# Patient Record
Sex: MALE | Race: WHITE | NOT HISPANIC OR LATINO | Employment: FULL TIME | URBAN - METROPOLITAN AREA
[De-identification: names, ages, dates, MRNs, and addresses within clinical notes are randomized per-mention and may not be internally consistent; named-entity substitution may affect disease eponyms.]

---

## 2017-09-25 ENCOUNTER — HOSPITAL ENCOUNTER (EMERGENCY)
Facility: HOSPITAL | Age: 22
Discharge: HOME | End: 2017-09-25
Attending: EMERGENCY MEDICINE | Admitting: EMERGENCY MEDICINE
Payer: OTHER MISCELLANEOUS

## 2017-09-25 VITALS
SYSTOLIC BLOOD PRESSURE: 118 MMHG | DIASTOLIC BLOOD PRESSURE: 60 MMHG | OXYGEN SATURATION: 97 % | TEMPERATURE: 98.1 F | WEIGHT: 158.73 LBS | HEART RATE: 68 BPM | RESPIRATION RATE: 16 BRPM

## 2017-09-25 DIAGNOSIS — T30.0 THERMAL BURN: Primary | ICD-10-CM

## 2017-09-25 PROCEDURE — 99283 EMERGENCY DEPT VISIT LOW MDM: CPT

## 2017-09-25 PROCEDURE — 90715 TDAP VACCINE 7 YRS/> IM: CPT | Performed by: EMERGENCY MEDICINE

## 2017-09-25 PROCEDURE — 90471 IMMUNIZATION ADMIN: CPT

## 2017-09-25 RX ORDER — GINSENG 100 MG
1 CAPSULE ORAL ONCE
Status: COMPLETED | OUTPATIENT
Start: 2017-09-25 | End: 2017-09-25

## 2017-09-25 RX ADMIN — BACITRACIN ZINC 1 SMALL APPLICATION: 500 OINTMENT TOPICAL at 09:57

## 2017-09-25 RX ADMIN — TETANUS TOXOID, REDUCED DIPHTHERIA TOXOID AND ACELLULAR PERTUSSIS VACCINE, ADSORBED 0.5 ML: 5; 2.5; 8; 8; 2.5 SUSPENSION INTRAMUSCULAR at 09:58

## 2020-07-08 ENCOUNTER — OFFICE VISIT (OUTPATIENT)
Dept: FAMILY MEDICINE CLINIC | Facility: CLINIC | Age: 25
End: 2020-07-08
Payer: COMMERCIAL

## 2020-07-08 VITALS
DIASTOLIC BLOOD PRESSURE: 66 MMHG | HEART RATE: 71 BPM | HEIGHT: 73 IN | SYSTOLIC BLOOD PRESSURE: 104 MMHG | RESPIRATION RATE: 16 BRPM | OXYGEN SATURATION: 98 % | TEMPERATURE: 98.1 F | BODY MASS INDEX: 22.53 KG/M2 | WEIGHT: 170 LBS

## 2020-07-08 DIAGNOSIS — Z13.6 SCREENING FOR CARDIOVASCULAR CONDITION: ICD-10-CM

## 2020-07-08 DIAGNOSIS — R22.32 MASS OF LEFT FOREARM: ICD-10-CM

## 2020-07-08 DIAGNOSIS — Z00.00 ANNUAL PHYSICAL EXAM: Primary | ICD-10-CM

## 2020-07-08 DIAGNOSIS — D17.9 LIPOMA, UNSPECIFIED SITE: ICD-10-CM

## 2020-07-08 DIAGNOSIS — M79.10 MYALGIA: ICD-10-CM

## 2020-07-08 DIAGNOSIS — Z13.29 THYROID DISORDER SCREEN: ICD-10-CM

## 2020-07-08 PROCEDURE — 99203 OFFICE O/P NEW LOW 30 MIN: CPT | Performed by: FAMILY MEDICINE

## 2020-07-08 PROCEDURE — 99385 PREV VISIT NEW AGE 18-39: CPT | Performed by: FAMILY MEDICINE

## 2020-07-08 PROCEDURE — 1036F TOBACCO NON-USER: CPT | Performed by: FAMILY MEDICINE

## 2020-07-08 PROCEDURE — 3008F BODY MASS INDEX DOCD: CPT | Performed by: FAMILY MEDICINE

## 2020-07-08 NOTE — PATIENT INSTRUCTIONS
Please contact your insurance if you are uncertain of coverage for plan of care items  Your insurance may not cover the cost of your Vitamin D blood test, which is approximately $65-70  Please notify the lab prior to blood draw if you would like to decline this test       Wellness Visit for Adults   AMBULATORY CARE:   A wellness visit  is when you see your healthcare provider to get screened for health problems  You can also get advice on how to stay healthy  Write down your questions so you remember to ask them  Ask your healthcare provider how often you should have a wellness visit  What happens at a wellness visit:  Your healthcare provider will ask about your health, and your family history of health problems  This includes high blood pressure, heart disease, and cancer  He or she will ask if you have symptoms that concern you, if you smoke, and about your mood  You may also be asked about your intake of medicines, supplements, food, and alcohol  Any of the following may be done:  · Your weight  will be checked  Your height may also be checked so your body mass index (BMI) can be calculated  Your BMI shows if you are at a healthy weight  · Your blood pressure  and heart rate will be checked  Your temperature may also be checked  · Blood and urine tests  may be done  Blood tests may be done to check your cholesterol levels  Abnormal cholesterol levels increase your risk for heart disease and stroke  You may also need a blood or urine test to check for diabetes if you are at increased risk  Urine tests may be done to look for signs of an infection or kidney disease  · A physical exam  includes checking your heartbeat and lungs with a stethoscope  Your healthcare provider may also check your skin to look for sun damage  · Screening tests  may be recommended  A screening test is done to check for diseases that may not cause symptoms   The screening tests you may need depend on your age, gender, family history, and lifestyle habits  For example, colorectal screening may be recommended if you are 48years old or older  Screening tests you need if you are a woman:   · A Pap smear  is used to screen for cervical cancer  Pap smears are usually done every 3 to 5 years depending on your age  You may need them more often if you have had abnormal Pap smear test results in the past  Ask your healthcare provider how often you should have a Pap smear  · A mammogram  is an x-ray of your breasts to screen for breast cancer  Experts recommend mammograms every 2 years starting at age 48 years  You may need a mammogram at age 52 years or younger if you have an increased risk for breast cancer  Talk to your healthcare provider about when you should start having mammograms and how often you need them  Vaccines you may need:   · Get an influenza vaccine  every year  The influenza vaccine protects you from the flu  Several types of viruses cause the flu  The viruses change over time, so new vaccines are made each year  · Get a tetanus-diphtheria (Td) booster vaccine  every 10 years  This vaccine protects you against tetanus and diphtheria  Tetanus is a severe infection that may cause painful muscle spasms and lockjaw  Diphtheria is a severe bacterial infection that causes a thick covering in the back of your mouth and throat  · Get a human papillomavirus (HPV) vaccine  if you are female and aged 23 to 32 or male 23 to 24 and never received it  This vaccine protects you from HPV infection  HPV is the most common infection spread by sexual contact  HPV may also cause vaginal, penile, and anal cancers  · Get a pneumococcal vaccine  if you are aged 72 years or older  The pneumococcal vaccine is an injection given to protect you from pneumococcal disease  Pneumococcal disease is an infection caused by pneumococcal bacteria  The infection may cause pneumonia, meningitis, or an ear infection      · Get a shingles vaccine  if you are aged 61 or older, even if you have had shingles before  The shingles vaccine is an injection to protect you from the varicella-zoster virus  This is the same virus that causes chickenpox  Shingles is a painful rash that develops in people who had chickenpox or have been exposed to the virus  How to eat healthy:  My Plate is a model for planning healthy meals  It shows the types and amounts of foods that should go on your plate  Fruits and vegetables make up about half of your plate, and grains and protein make up the other half  A serving of dairy is included on the side of your plate  The amount of calories and serving sizes you need depends on your age, gender, weight, and height  Examples of healthy foods are listed below:  · Eat a variety of vegetables  such as dark green, red, and orange vegetables  You can also include canned vegetables low in sodium (salt) and frozen vegetables without added butter or sauces  · Eat a variety of fresh fruits , canned fruit in 100% juice, frozen fruit, and dried fruit  · Include whole grains  At least half of the grains you eat should be whole grains  Examples include whole-wheat bread, wheat pasta, brown rice, and whole-grain cereals such as oatmeal     · Eat a variety of protein foods such as seafood (fish and shellfish), lean meat, and poultry without skin (turkey and chicken)  Examples of lean meats include pork leg, shoulder, or tenderloin, and beef round, sirloin, tenderloin, and extra lean ground beef  Other protein foods include eggs and egg substitutes, beans, peas, soy products, nuts, and seeds  · Choose low-fat dairy products such as skim or 1% milk or low-fat yogurt, cheese, and cottage cheese  · Limit unhealthy fats  such as butter, hard margarine, and shortening  Exercise:  Exercise at least 30 minutes per day on most days of the week  Some examples of exercise include walking, biking, dancing, and swimming   You can also fit in more physical activity by taking the stairs instead of the elevator or parking farther away from stores  Include muscle strengthening activities 2 days each week  Regular exercise provides many health benefits  It helps you manage your weight, and decreases your risk for type 2 diabetes, heart disease, stroke, and high blood pressure  Exercise can also help improve your mood  Ask your healthcare provider about the best exercise plan for you  General health and safety guidelines:   · Do not smoke  Nicotine and other chemicals in cigarettes and cigars can cause lung damage  Ask your healthcare provider for information if you currently smoke and need help to quit  E-cigarettes or smokeless tobacco still contain nicotine  Talk to your healthcare provider before you use these products  · Limit alcohol  A drink of alcohol is 12 ounces of beer, 5 ounces of wine, or 1½ ounces of liquor  · Lose weight, if needed  Being overweight increases your risk of certain health conditions  These include heart disease, high blood pressure, type 2 diabetes, and certain types of cancer  · Protect your skin  Do not sunbathe or use tanning beds  Use sunscreen with a SPF 15 or higher  Apply sunscreen at least 15 minutes before you go outside  Reapply sunscreen every 2 hours  Wear protective clothing, hats, and sunglasses when you are outside  · Drive safely  Always wear your seatbelt  Make sure everyone in your car wears a seatbelt  A seatbelt can save your life if you are in an accident  Do not use your cell phone when you are driving  This could distract you and cause an accident  Pull over if you need to make a call or send a text message  · Practice safe sex  Use latex condoms if are sexually active and have more than one partner  Your healthcare provider may recommend screening tests for sexually transmitted infections (STIs)  · Wear helmets, lifejackets, and protective gear    Always wear a helmet when you ride a bike or motorcycle, go skiing, or play sports that could cause a head injury  Wear protective equipment when you play sports  Wear a lifejacket when you are on a boat or doing water sports  © 2017 [employer] Information is for End User's use only and may not be sold, redistributed or otherwise used for commercial purposes  All illustrations and images included in CareNotes® are the copyrighted property of [employer]  or Zackary Romero  The above information is an  only  It is not intended as medical advice for individual conditions or treatments  Talk to your doctor, nurse or pharmacist before following any medical regimen to see if it is safe and effective for you  Cholesterol and Your Health   AMBULATORY CARE:   Cholesterol  is a waxy, fat-like substance  Cholesterol is made by your body, but also comes from certain foods you eat  Your body uses cholesterol to make hormones and new cells  Your body also uses cholesterol to protect nerves  Cholesterol comes from foods such as meat and dairy products  Your total cholesterol level is made up by LDL cholesterol, HDL cholesterol, and triglycerides:  · LDL cholesterol  is called bad cholesterol  because it forms plaque in your arteries  As plaque builds up, your arteries become narrow, and less blood flows through  When plaque decreases blood flow to your heart, you may have chest pain  If plaque completely blocks an artery that bring blood to your heart, you may have a heart attack  Plaque can break off and form blood clots  Blood clots may block arteries in your brain and cause a stroke  · HDL cholesterol  is called good cholesterol  because it helps remove LDL cholesterol from your arteries  It does this by attaching to LDL cholesterol and carrying it to your liver  Your liver breaks down LDL cholesterol so your body can get rid of it   High levels of HDL cholesterol can help prevent a heart attack and stroke  Low levels of HDL cholesterol can increase your risk for heart disease, heart attack, and stroke  · Triglycerides  are a type of fat that store energy from foods you eat  High levels of triglycerides also cause plaque buildup  This can increase your risk for a heart attack or stroke  If your triglyceride level is high, your LDL cholesterol level may also be high  How food affects your cholesterol levels:   · Unhealthy fats  increase LDL cholesterol and triglyceride levels in your blood  They are found in foods high in cholesterol, saturated fat, and trans fat:     ¨ Cholesterol  is found in eggs, dairy, and meat  ¨ Saturated fat  is found in butter, cheese, ice cream, whole milk, and coconut oil  Saturated fat is also found in meat, such as sausage, hot dogs, and bologna  ¨ Trans fat  is found in liquid oils and is used in fried and baked foods  Foods that contain trans fats include chips, crackers, muffins, sweet rolls, microwave popcorn, and cookies  · Healthy fats,  also called unsaturated fats, help lower LDL cholesterol and triglyceride levels  Healthy fats include the following:     ¨ Monounsaturated fats  are found in foods such as olive oil, canola oil, avocado, nuts, and olives  ¨ Polyunsaturated fats,  such as omega 3 fats, are found in fish, such as salmon, trout, and tuna  They can also be found in plant foods such as flaxseed, walnuts, and soybeans  Other things that affect your cholesterol levels:   · Smoking cigarettes    · Being overweight or obese     · Drinking large amounts of alcohol    · Not enough exercise or no exercise    · Certain genes passed from your parents to you  What you need to know about having your cholesterol levels checked: Adults 21to 39years of age should have their cholesterol levels checked every 4 to 6 years  Adults 45 years and older should have their cholesterol checked every 1 to 2 years   You may need your cholesterol checked more often, or at a younger age, if you have risk factors for heart disease  You may also need to have your cholesterol checked more often if you have other health conditions, such as diabetes  Blood tests are used to check cholesterol levels  Blood tests measure your levels of triglycerides, LDL cholesterol, and HDL cholesterol  Cholesterol level goals: Your cholesterol level goal may depend on your risk for heart disease  It may also depend on your age and other health conditions  Ask your healthcare provider if the following goals are right for you:  · Your total cholesterol level  should be less than 200 mg/dL  This number may also depend on your HDL and LDL cholesterol goals  · Your LDL cholesterol level  should be less than 130 mg/dL  · Your HDL cholesterol level  should be 60 mg/dL or higher  · Your triglyceride level  should be less than 150 mg/dL  Treatment for high cholesterol:  Treatment for high cholesterol will also decrease your risk of heart disease, heart attack, and stroke  Treatment may include any of the following:  · Medicines  may be given to lower your LDL cholesterol, triglyceride levels, or total cholesterol level  You may need medicines to lower your cholesterol if any of the following is true:     ¨ You have a history of stroke, TIA, unstable angina, or a heart attack    ¨ Your LDL cholesterol level is 190 mg/dL or higher    ¨ You are age 36to 76years of age, have diabetes, and your LDL cholesterol is 70 mg/dL or higher    ¨ You are age 36to 76years of age, have risk factors for heart disease, and your LDL cholesterol is 70 mg/dL or higher    · Lifestyle changes  include changes to your diet, exercise, weight loss, and quitting smoking  It also includes decreasing the amount of alcohol you drink  · Supplements  include fish oil, red yeast rice, and garlic  Fish oil may help lower your triglyceride and LDL cholesterol levels   It may also increase your HDL cholesterol level  Red yeast rice may help decrease your total cholesterol level and LDL cholesterol level  Garlic may help lower your total cholesterol level  Do not take these supplements without talking to your healthcare provider  Nutrition to help lower your cholesterol levels:  A registered dietitian can help you create a healthy eating plan  Read food labels and choose foods low in saturated fat, trans fats, and cholesterol  · Decrease the total amount of fat you eat  Choose lean meats, fat-free or 1% fat milk, and low-fat dairy products, such as yogurt and cheese  Try to limit or avoid red meats  Limit or do not eat fried foods or baked goods such as cookies  · Replace unhealthy fats with healthy fats  Cook foods in olive oil or canola oil  Choose soft margarines that are low in saturated fat and trans fat  Seeds, nuts, and avocados are other examples of healthy fats  · Eat foods with omega-3 fats  Examples include salmon, tuna, mackerel, walnuts, and flaxseed  Eat fish 2 times per week  Children and pregnant women should not eat fish that have high levels of mercury, such as shark, swordfish, and noris mackerel  · Increase the amount of plant-based foods you eat  Plant-based foods are low in cholesterol and fat  Eating more of these foods may help lower your cholesterol and help you lose weight  Examples of plant-based foods includes fruits, vegetables, legumes, and whole grains  Replace milk that contains dairy with almond, soy, or coconut milk  Eat beans and foods with soy for protein instead of meat  Ask your healthcare provider or dietitian for more information on plant-based foods  · Increase the amount of fiber you eat  High-fiber foods can help lower your LDL cholesterol  You should eat between 20 and 30 grams of fiber each day  Eat at least 5 servings of fruits and vegetables each day   Other examples of high-fiber foods include whole-grain or whole-wheat breads, pastas, or cereals, and brown rice  Eat 3 ounces of whole-grain foods each day  Increase fiber slowly  You may have abdominal discomfort, bloating, and gas if you add fiber to your diet too quickly  Lifestyle changes you can make to help lower your cholesterol levels:   · Maintain a healthy weight  Ask your healthcare provider how much you should weigh  Ask him or her to help you create a weight loss plan if you are overweight  Weight loss can decrease your total cholesterol and triglyceride levels  · Exercise regularly  Exercise can help lower your total cholesterol level and maintain a healthy weight  Exercise can also help increase your HDL cholesterol level  Work with your healthcare provider to create an exercise program that is right for you  Get at least 30 minutes of moderate exercise most days of the week  Examples of exercise include brisk walking, swimming, or biking  · Do not smoke  Nicotine and other chemicals in cigarettes and cigars can damage your lungs, heart, and blood vessels  They can also raise your triglyceride levels  Ask your healthcare provider for information if you currently smoke and need help to quit  E-cigarettes or smokeless tobacco still contain nicotine  Talk to your healthcare provider before you use these products  · Limit or do not drink alcohol  Alcohol can increase your triglyceride levels  Ask your healthcare provider if it is safe for you to drink alcohol  Also ask how much is safe for you to drink each day  © 2017 [address] [employer]  Information is for End User's use only and may not be sold, redistributed or otherwise used for commercial purposes  All illustrations and images included in CareNotes® are the copyrighted property of [employer]  or Zackary Romero  The above information is an  only  It is not intended as medical advice for individual conditions or treatments   Talk to your doctor, nurse or pharmacist before following any medical regimen to see if it is safe and effective for you

## 2020-07-08 NOTE — PROGRESS NOTES
Assessment/Plan:  Problem List Items Addressed This Visit     None      Visit Diagnoses     Annual physical exam    -  Primary    Recommend lifestyle modifications  Lipoma, unspecified site        Relevant Orders    Ambulatory referral to [facility] limited    Patient considering likely lipoma removal as bothersome with weightlifting  Mass of left forearm        Relevant Orders    Ambulatory referral to Hand Surgery     [facility] limited    Likely due to lipoma  Screening for cardiovascular condition        Relevant Orders    CBC and differential    Comprehensive metabolic panel    Lipid panel    LDL cholesterol, direct    Thyroid disorder screen        Relevant Orders    TSH, 3rd generation with Free T4 reflex    Myalgia        Relevant Orders    Vitamin D 25 hydroxy           Return in about 1 year (around 7/8/2021) for Annual physical; PRN Labs  No future appointments  Subjective: Dania Menon is a 30 y o  male who presents today as a new patient for his medical conditions  New Patient    Previous PCP:  Dr Lillie Martell at [address]  Reason for Transfer:  Wife prefers [facility] seen by previous PCP:  12/12/17  Last Labs:  2/10/18  Last Physical:  Unsure  Medical Records Requsted:  No      HPI:  Chief Complaint   Patient presents with    Establish Care     New patient  Declined HIV     Annual Exam     Annual exam,    Mass     Has lump in arms, no pain, new ones has grown      -- Above per clinical staff and reviewed  --      HPI      Today:      Controlled Substance Review    [place] PDMP or [place]  reviewed: No red flags were identified; safe to proceed with prescription  [address]  +Exercise - Working out - weightlifting, cardio, - 1 hour, 5 days per week    Left Ventral Forearm, B/L Hips, Lower Abdomen Mass - Symptoms x years, unchanged  No imaging previously though prior PCP ordered LUE U/S  No pain, warmth, or redness        Reviewed:  Labs 2/10/18    Overdue for Dentist   [facility] for Optho  The following portions of the patient's history were reviewed and updated as appropriate: allergies, current medications, past family history, past medical history, past social history, past surgical history and problem list       Review of Systems   Constitutional: Negative for appetite change, chills, diaphoresis, fatigue and fever  Respiratory: Negative for chest tightness and shortness of breath  Cardiovascular: Negative for chest pain  Gastrointestinal: Negative for abdominal pain, blood in stool, diarrhea, nausea and vomiting  Genitourinary: Negative for dysuria  No current outpatient medications on file  No current facility-administered medications for this visit  Objective:  /66   Pulse 71   Temp 98 1 °F (36 7 °C) (Tympanic)   Resp 16   Ht 6' 0 64" (1 845 m)   Wt 77 1 kg (170 lb)   SpO2 98%   BMI 22 65 kg/m²    Wt Readings from Last 3 Encounters:   07/08/20 77 1 kg (170 lb)   09/25/17 72 kg (158 lb 11 7 oz)      BP Readings from Last 3 Encounters:   07/08/20 104/66   09/25/17 118/60          Physical Exam   Constitutional: He is oriented to person, place, and time  He appears well-developed and well-nourished  HENT:   Head: Normocephalic and atraumatic  Right Ear: Tympanic membrane, external ear and ear canal normal    Left Ear: Tympanic membrane, external ear and ear canal normal    Nose: Nose normal  Right sinus exhibits no maxillary sinus tenderness and no frontal sinus tenderness  Left sinus exhibits no maxillary sinus tenderness and no frontal sinus tenderness  Mouth/Throat: Uvula is midline, oropharynx is clear and moist and mucous membranes are normal  No tonsillar exudate  Eyes: Pupils are equal, round, and reactive to light  Conjunctivae and EOM are normal    Neck: Neck supple  No thyromegaly present  Cardiovascular: Normal rate, regular rhythm, normal heart sounds and intact distal pulses  Pulmonary/Chest: Effort normal and breath sounds normal    Abdominal: Soft  Bowel sounds are normal  He exhibits no distension and no mass  There is no tenderness  There is no rebound and no guarding  Musculoskeletal: He exhibits no edema or tenderness  Lymphadenopathy:     He has no cervical adenopathy  Neurological: He is alert and oriented to person, place, and time  Skin: No rash noted  Soft, rubbery, spindle-shaped mass:  Left ventral forearm 3cm x cm, Left Flank x 2 - 2cm x 1cm, R Flank 1 5cm x 1cm   Psychiatric: He has a normal mood and affect  His behavior is normal  Judgment and thought content normal    Nursing note and vitals reviewed  Lab Results:      No results found for: WBC, HGB, HCT, PLT, CHOL, TRIG, HDL, LDLDIRECT, ALT, AST, NA, K, CL, CREATININE, BUN, CO2, TSH, PSA, INR, GLUF, HGBA1C, MICROALBUR  No results found for: URICACID  Invalid input(s): BASENAME Vitamin D    No results found       POCT Labs

## 2020-07-08 NOTE — PROGRESS NOTES
Assessment/Plan:  Problem List Items Addressed This Visit     None      Visit Diagnoses     Annual physical exam    -  Primary    Lipoma, unspecified site        Relevant Orders    Ambulatory referral to Hand Surgery    [facility] MSK limited    Mass of left forearm        Relevant Orders    Ambulatory referral to Hand Surgery    US MSK limited    Screening for cardiovascular condition        Relevant Orders    CBC and differential    Comprehensive metabolic panel    Lipid panel    LDL cholesterol, direct    Thyroid disorder screen        Relevant Orders    TSH, 3rd generation with Free T4 reflex    Myalgia        Relevant Orders    Vitamin D 25 hydroxy           Return in about 1 year (around 7/8/2021) for Annual physical; PRN Labs  No future appointments  Subjective: Phyllis Madrid is a 30 y o  male who presents today for his physical         HPI:  Chief Complaint   Patient presents with   [address] patient  Declined HIV     Annual Exam     Annual exam,    Mass     Has lump in arms, no pain, new ones has grown      -- Above per clinical staff and reviewed  --      HPI      Today:      Physical    Watching diet  +Exercise  Tdap up to date  Declines HIV and Hep C screens  Overdue for Dentist   Lesvia Deluna for Optho  The following portions of the patient's history were reviewed and updated as appropriate: allergies, current medications, past family history, past medical history, past social history, past surgical history and problem list       Review of Systems     See other note  No current outpatient medications on file  No current facility-administered medications for this visit          Objective:  /66   Pulse 71   Temp 98 1 °F (36 7 °C) (Tympanic)   Resp 16   Ht 6' 0 64" (1 845 m)   Wt 77 1 kg (170 lb)   SpO2 98%   BMI 22 65 kg/m²    Wt Readings from Last 3 Encounters:   07/08/20 77 1 kg (170 lb)   09/25/17 72 kg (158 lb 11 7 oz)      BP Readings from Last 3 Encounters:   07/08/20 104/66   09/25/17 118/60          Physical Exam     Constitutional: He is oriented to person, place, and time  He appears well-developed and well-nourished  HENT:   Head: Normocephalic and atraumatic  Right Ear: Tympanic membrane, external ear and ear canal normal    Left Ear: Tympanic membrane, external ear and ear canal normal    Nose: Nose normal  Right sinus exhibits no maxillary sinus tenderness and no frontal sinus tenderness  Left sinus exhibits no maxillary sinus tenderness and no frontal sinus tenderness  Mouth/Throat: Uvula is midline, oropharynx is clear and moist and mucous membranes are normal  No tonsillar exudate  Eyes: Pupils are equal, round, and reactive to light  Conjunctivae and EOM are normal    Neck: Neck supple  No thyromegaly present  Cardiovascular: Normal rate, regular rhythm, normal heart sounds and intact distal pulses  Pulmonary/Chest: Effort normal and breath sounds normal    Abdominal: Soft  Bowel sounds are normal  He exhibits no distension and no mass  There is no tenderness  There is no rebound and no guarding  Musculoskeletal: He exhibits no edema or tenderness  Lymphadenopathy:     He has no cervical adenopathy  Neurological: He is alert and oriented to person, place, and time  Skin: No rash noted  Soft, rubbery, spindle-shaped mass:  Left ventral forearm 3cm x cm, Left Flank x 2 - 2cm x 1cm, R Flank 1 5cm x 1cm   Psychiatric: He has a normal mood and affect  His behavior is normal  Judgment and thought content normal    Nursing note and vitals reviewed  Lab Results:      No results found for: WBC, HGB, HCT, PLT, CHOL, TRIG, HDL, LDLDIRECT, ALT, AST, NA, K, CL, CREATININE, BUN, CO2, TSH, PSA, INR, GLUF, HGBA1C, MICROALBUR  No results found for: URICACID  Invalid input(s): BASENAME Vitamin D    No results found       POCT Labs

## 2020-09-22 ENCOUNTER — OFFICE VISIT (OUTPATIENT)
Dept: URGENT CARE | Age: 25
End: 2020-09-22
Payer: COMMERCIAL

## 2020-09-22 VITALS
WEIGHT: 175 LBS | SYSTOLIC BLOOD PRESSURE: 120 MMHG | BODY MASS INDEX: 23.7 KG/M2 | HEART RATE: 60 BPM | RESPIRATION RATE: 16 BRPM | OXYGEN SATURATION: 98 % | TEMPERATURE: 97.5 F | DIASTOLIC BLOOD PRESSURE: 67 MMHG | HEIGHT: 72 IN

## 2020-09-22 DIAGNOSIS — H10.32 ACUTE BACTERIAL CONJUNCTIVITIS OF LEFT EYE: Primary | ICD-10-CM

## 2020-09-22 PROCEDURE — G0382 LEV 3 HOSP TYPE B ED VISIT: HCPCS | Performed by: NURSE PRACTITIONER

## 2020-09-22 RX ORDER — GENTAMICIN SULFATE 3 MG/ML
1 SOLUTION/ DROPS OPHTHALMIC EVERY 4 HOURS
Qty: 5 ML | Refills: 0 | Status: SHIPPED | OUTPATIENT
Start: 2020-09-22 | End: 2021-08-30

## 2020-09-22 NOTE — PROGRESS NOTES
[address]        NAME: Keith Moscoso is a 30 y o  male  : 1995    MRN: [id number]  DATE: 2020  TIME: 8:06 AM    Assessment and Plan   Acute bacterial conjunctivitis of left eye [H10 32]  1  Acute bacterial conjunctivitis of left eye  gentamicin (GARAMYCIN) 0 3 % ophthalmic solution         Patient Instructions     Wash hands frequently  Use medication while awake  Do not let dropper touch your eye  Follow up with PCP in 3-5 days  Proceed to  ER if symptoms worsen  Chief Complaint     Chief Complaint   Patient presents with    Eye Problem     Patient reports left eye irritation x 2 days  Denies specific injury   Denies visual changes  History of Present Illness       HPI   Presents to clinic with complaint of irritation and redness in the left eye  Duration 2 days  No trauma  No change in vision  History of astigmatism  Uses glasses  No use of contacts  Review of Systems   Review of Systems   Constitutional: Negative for chills and fever  Eyes: Positive for pain (more like pressure than actual pain), discharge (clear watery), redness and itching  Negative for visual disturbance  Respiratory: Negative for shortness of breath            Current Medications       Current Outpatient Medications:     gentamicin (GARAMYCIN) 0 3 % ophthalmic solution, Administer 1 drop into the left eye every 4 (four) hours 7 days, Disp: 5 mL, Rfl: 0    Current Allergies     Allergies as of 2020    (No Known Allergies)            The following portions of the patient's history were reviewed and updated as appropriate: allergies, current medications, past family history, past medical history, past social history, past surgical history and problem list      Past Medical History:   Diagnosis Date    No pertinent past medical history        Past Surgical History:   Procedure Laterality Date    WISDOM TOOTH EXTRACTION      x 1       Family History   Problem Relation Age of Onset  No Known Problems Mother     No Known Problems Father     No Known Problems Sister     No Known Problems Brother     No Known Problems Son     No Known Problems Maternal Grandmother     No Known Problems Maternal Grandfather     No Known Problems Paternal Grandmother     No Known Problems Paternal Grandfather          Medications have been verified  Objective   /67   Pulse 60   Temp 97 5 °F (36 4 °C) (Temporal)   Resp 16   Ht 6' (1 829 m)   Wt 79 4 kg (175 lb)   SpO2 98%   BMI 23 73 kg/m²        Physical Exam     Physical Exam  Constitutional:       Appearance: He is not ill-appearing  Eyes:      General:         Right eye: No discharge  Left eye: Discharge (yellowish discharge and watery discharge) present  Extraocular Movements: Extraocular movements intact  Pupils: Pupils are equal, round, and reactive to light  Comments: Severe erythematous conjunctiva on the left eye   Neurological:      Mental Status: He is alert

## 2020-09-22 NOTE — PATIENT INSTRUCTIONS

## 2021-06-05 ENCOUNTER — IMMUNIZATIONS (OUTPATIENT)
Dept: FAMILY MEDICINE CLINIC | Facility: HOSPITAL | Age: 26
End: 2021-06-05

## 2021-06-05 DIAGNOSIS — Z23 ENCOUNTER FOR IMMUNIZATION: Primary | ICD-10-CM

## 2021-06-05 PROCEDURE — 0001A SARS-COV-2 / COVID-19 MRNA VACCINE (PFIZER-BIONTECH) 30 MCG: CPT

## 2021-06-05 PROCEDURE — 91300 SARS-COV-2 / COVID-19 MRNA VACCINE (PFIZER-BIONTECH) 30 MCG: CPT

## 2021-06-30 ENCOUNTER — IMMUNIZATIONS (OUTPATIENT)
Dept: FAMILY MEDICINE CLINIC | Facility: HOSPITAL | Age: 26
End: 2021-06-30

## 2021-06-30 DIAGNOSIS — Z23 ENCOUNTER FOR IMMUNIZATION: Primary | ICD-10-CM

## 2021-06-30 PROCEDURE — 0002A SARS-COV-2 / COVID-19 MRNA VACCINE (PFIZER-BIONTECH) 30 MCG: CPT

## 2021-06-30 PROCEDURE — 91300 SARS-COV-2 / COVID-19 MRNA VACCINE (PFIZER-BIONTECH) 30 MCG: CPT

## 2021-08-30 ENCOUNTER — OFFICE VISIT (OUTPATIENT)
Dept: FAMILY MEDICINE CLINIC | Facility: CLINIC | Age: 26
End: 2021-08-30

## 2021-08-30 VITALS
SYSTOLIC BLOOD PRESSURE: 110 MMHG | HEART RATE: 71 BPM | RESPIRATION RATE: 16 BRPM | WEIGHT: 176.8 LBS | HEIGHT: 72 IN | BODY MASS INDEX: 23.95 KG/M2 | DIASTOLIC BLOOD PRESSURE: 78 MMHG | TEMPERATURE: 97.8 F | OXYGEN SATURATION: 98 %

## 2021-08-30 DIAGNOSIS — Z13.29 THYROID DISORDER SCREEN: ICD-10-CM

## 2021-08-30 DIAGNOSIS — R22.32 MASS OF LEFT FOREARM: ICD-10-CM

## 2021-08-30 DIAGNOSIS — Z11.59 NEED FOR HEPATITIS C SCREENING TEST: ICD-10-CM

## 2021-08-30 DIAGNOSIS — Z11.4 SCREENING FOR HIV (HUMAN IMMUNODEFICIENCY VIRUS): ICD-10-CM

## 2021-08-30 DIAGNOSIS — Z13.6 SCREENING FOR CARDIOVASCULAR CONDITION: ICD-10-CM

## 2021-08-30 DIAGNOSIS — Z00.00 ANNUAL PHYSICAL EXAM: Primary | ICD-10-CM

## 2021-08-30 DIAGNOSIS — D17.9 LIPOMA, UNSPECIFIED SITE: ICD-10-CM

## 2021-08-30 PROCEDURE — 99395 PREV VISIT EST AGE 18-39: CPT | Performed by: FAMILY MEDICINE

## 2021-08-30 NOTE — PROGRESS NOTES
Assessment/Plan:  Problem List Items Addressed This Visit     None      Visit Diagnoses     Annual physical exam    -  Primary    Mass of left forearm        Relevant Orders    US MSK limited    R/O Lipoma vs  Mass  Lipoma, unspecified site        Relevant Orders    US MSK limited    Monitor  Screening for HIV (human immunodeficiency virus)        Relevant Orders    HIV 1/2 Antigen/Antibody (4th Generation) w Reflex [facility]    Need for hepatitis C screening test        Relevant Orders    Hepatitis C antibody    Screening for cardiovascular condition        Relevant Orders    CBC and differential    Comprehensive metabolic panel    Lipid panel    LDL cholesterol, direct    Thyroid disorder screen        Relevant Orders    TSH, 3rd generation with Free T4 reflex           Return in about 1 year (around 8/30/2022) for Annual physical; PRN Labs  Future Appointments   Date Time Provider Buzz Siddiqui   8/30/2022  2:20 PM Jordi Michele DO [facility]        Subjective: Shruthi Maya is a 27 y o  male who presents today for a follow-up on his chronic medical conditions  HPI:  Chief Complaint   Patient presents with    Physical Exam     lumps all over body     Labs Only     ok for HIV/HEP C      -- Above per clinical staff and reviewed  --      HPI      Today:      Return in about 1 year (around 7/8/2021) for Annual physical; PRN Labs      Watching diet  +Exercise - Working out - weightlifting, cardio, - 1 hour, 5 days per week      Left Ventral Forearm (some sensitivities), B/L Hips, Lower Abdomen Mass - Symptoms x years, unchanged  No imaging previously though prior PCP ordered LUE U/S  No pain, warmth, or redness  Patient did not complete U/S Left forearm mass - R/O Lipoma vs  Malignancy - ordered 7/8/20        Reviewed:  Labs 2/10/18     Overdue for Dentist   Montse Colin q2 years          PHQ-9 Depression Screening    PHQ-9:   Frequency of the following problems over the past two weeks: Little interest or pleasure in doing things: 0 - not at all  Feeling down, depressed, or hopeless: 0 - not at all  PHQ-2 Score: 0                The following portions of the patient's history were reviewed and updated as appropriate: allergies, current medications, past family history, past medical history, past social history, past surgical history and problem list       Review of Systems   Constitutional: Negative for appetite change, chills, diaphoresis, fatigue and fever  Respiratory: Negative for chest tightness and shortness of breath  Cardiovascular: Negative for chest pain  Gastrointestinal: Negative for abdominal pain, blood in stool, diarrhea, nausea and vomiting  Genitourinary: Negative for dysuria  No current outpatient medications on file  No current facility-administered medications for this visit  Objective:  /78   Pulse 71   Temp 97 8 °F (36 6 °C)   Resp 16   Ht 5' 11 65" (1 82 m)   Wt 80 2 kg (176 lb 12 8 oz)   SpO2 98%   BMI 24 21 kg/m²    Wt Readings from Last 3 Encounters:   08/30/21 80 2 kg (176 lb 12 8 oz)   09/22/20 79 4 kg (175 lb)   07/08/20 77 1 kg (170 lb)      BP Readings from Last 3 Encounters:   08/30/21 110/78   09/22/20 120/67   07/08/20 104/66          Physical Exam  Vitals and nursing note reviewed  Constitutional:       Appearance: Normal appearance  He is well-developed and normal weight  HENT:      Head: Normocephalic and atraumatic  Right Ear: Tympanic membrane, ear canal and external ear normal       Left Ear: Tympanic membrane, ear canal and external ear normal       Nose: Nose normal       Right Sinus: No maxillary sinus tenderness or frontal sinus tenderness  Left Sinus: No maxillary sinus tenderness or frontal sinus tenderness  Mouth/Throat:      Mouth: Mucous membranes are moist       Pharynx: Oropharynx is clear  Uvula midline  Tonsils: No tonsillar exudate     Eyes:      Extraocular Movements: Extraocular movements intact  Conjunctiva/sclera: Conjunctivae normal       Pupils: Pupils are equal, round, and reactive to light  Neck:      Thyroid: No thyromegaly  Cardiovascular:      Rate and Rhythm: Normal rate and regular rhythm  Pulses: Normal pulses  Heart sounds: Normal heart sounds  Pulmonary:      Effort: Pulmonary effort is normal       Breath sounds: Normal breath sounds  Abdominal:      General: Bowel sounds are normal  There is no distension  Palpations: Abdomen is soft  There is no mass  Tenderness: There is no abdominal tenderness  There is no guarding or rebound  Musculoskeletal:         General: No swelling or tenderness  Cervical back: Neck supple  Right lower leg: No edema  Left lower leg: No edema  Lymphadenopathy:      Cervical: No cervical adenopathy  Skin:     Findings: No rash  Comments: Soft, rubbery, spindle-shaped mass:  Left ventral forearm 4cm x 2cm, Left Flank x 2 - 2cm x 1cm, R Flank 1 5cm x 1cm, RLQ 2cm x 1 cm, RUQ 1cm x 1cm   Neurological:      General: No focal deficit present  Mental Status: He is alert and oriented to person, place, and time  Psychiatric:         Mood and Affect: Mood normal          Behavior: Behavior normal          Thought Content: Thought content normal          Judgment: Judgment normal          Lab Results:      No results found for: WBC, HGB, HCT, PLT, CHOL, TRIG, HDL, LDLDIRECT, ALT, AST, NA, K, CL, CREATININE, BUN, CO2, TSH, PSA, INR, GLUF, HGBA1C, MICROALBUR  No results found for: URICACID  Invalid input(s): BASENAME Vitamin D    No results found       POCT Labs

## 2021-08-30 NOTE — PATIENT INSTRUCTIONS
Wellness Visit for Adults   AMBULATORY CARE:   A wellness visit  is when you see your healthcare provider to get screened for health problems  Your healthcare provider will also give you advice on how to stay healthy  Write down your questions so you remember to ask them  Ask your healthcare provider how often you should have a wellness visit  What happens at a wellness visit:  Your healthcare provider will ask about your health, and your family history of health problems  This includes high blood pressure, heart disease, and cancer  He or she will ask if you have symptoms that concern you, if you smoke, and about your mood  You may also be asked about your intake of medicines, supplements, food, and alcohol  Any of the following may be done:  · Your weight  will be checked  Your height may also be checked so your body mass index (BMI) can be calculated  Your BMI shows if you are at a healthy weight  · Your blood pressure  and heart rate will be checked  Your temperature may also be checked  · Blood and urine tests  may be done  Blood tests may be done to check your cholesterol levels  Abnormal cholesterol levels increase your risk for heart disease and stroke  You may also need a blood or urine test to check for diabetes if you are at increased risk  Urine tests may be done to look for signs of an infection or kidney disease  · A physical exam  includes checking your heartbeat and lungs with a stethoscope  Your healthcare provider may also check your skin to look for sun damage  · Screening tests  may be recommended  A screening test is done to check for diseases that may not cause symptoms  The screening tests you may need depend on your age, gender, family history, and lifestyle habits  For example, colorectal screening may be recommended if you are 48years old or older  Screening tests you need if you are a woman:   · A Pap smear  is used to screen for cervical cancer   Pap smears are usually done every 3 to 5 years depending on your age  You may need them more often if you have had abnormal Pap smear test results in the past  Ask your healthcare provider how often you should have a Pap smear  · A mammogram  is an x-ray of your breasts to screen for breast cancer  Experts recommend mammograms every 2 years starting at age 48 years  You may need a mammogram at age 52 years or younger if you have an increased risk for breast cancer  Talk to your healthcare provider about when you should start having mammograms and how often you need them  Vaccines you may need:   · Get an influenza vaccine  every year  The influenza vaccine protects you from the flu  Several types of viruses cause the flu  The viruses change over time, so new vaccines are made each year  · Get a tetanus-diphtheria (Td) booster vaccine  every 10 years  This vaccine protects you against tetanus and diphtheria  Tetanus is a severe infection that may cause painful muscle spasms and lockjaw  Diphtheria is a severe bacterial infection that causes a thick covering in the back of your mouth and throat  · Get a human papillomavirus (HPV) vaccine  if you are female and aged 23 to 32 or male 23 to 24 and never received it  This vaccine protects you from HPV infection  HPV is the most common infection spread by sexual contact  HPV may also cause vaginal, penile, and anal cancers  · Get a pneumococcal vaccine  if you are aged 72 years or older  The pneumococcal vaccine is an injection given to protect you from pneumococcal disease  Pneumococcal disease is an infection caused by pneumococcal bacteria  The infection may cause pneumonia, meningitis, or an ear infection  · Get a shingles vaccine  if you are 60 or older, even if you have had shingles before  The shingles vaccine is an injection to protect you from the varicella-zoster virus  This is the same virus that causes chickenpox   Shingles is a painful rash that develops in people who had chickenpox or have been exposed to the virus  How to eat healthy:  My Plate is a model for planning healthy meals  It shows the types and amounts of foods that should go on your plate  Fruits and vegetables make up about half of your plate, and grains and protein make up the other half  A serving of dairy is included on the side of your plate  The amount of calories and serving sizes you need depends on your age, gender, weight, and height  Examples of healthy foods are listed below:  · Eat a variety of vegetables  such as dark green, red, and orange vegetables  You can also include canned vegetables low in sodium (salt) and frozen vegetables without added butter or sauces  · Eat a variety of fresh fruits , canned fruit in 100% juice, frozen fruit, and dried fruit  · Include whole grains  At least half of the grains you eat should be whole grains  Examples include whole-wheat bread, wheat pasta, brown rice, and whole-grain cereals such as oatmeal     · Eat a variety of protein foods such as seafood (fish and shellfish), lean meat, and poultry without skin (turkey and chicken)  Examples of lean meats include pork leg, shoulder, or tenderloin, and beef round, sirloin, tenderloin, and extra lean ground beef  Other protein foods include eggs and egg substitutes, beans, peas, soy products, nuts, and seeds  · Choose low-fat dairy products such as skim or 1% milk or low-fat yogurt, cheese, and cottage cheese  · Limit unhealthy fats  such as butter, hard margarine, and shortening  Exercise:  Exercise at least 30 minutes per day on most days of the week  Some examples of exercise include walking, biking, dancing, and swimming  You can also fit in more physical activity by taking the stairs instead of the elevator or parking farther away from stores  Include muscle strengthening activities 2 days each week  Regular exercise provides many health benefits   It helps you manage your weight, and decreases your risk for type 2 diabetes, heart disease, stroke, and high blood pressure  Exercise can also help improve your mood  Ask your healthcare provider about the best exercise plan for you  General health and safety guidelines:   · Do not smoke  Nicotine and other chemicals in cigarettes and cigars can cause lung damage  Ask your healthcare provider for information if you currently smoke and need help to quit  E-cigarettes or smokeless tobacco still contain nicotine  Talk to your healthcare provider before you use these products  · Limit alcohol  A drink of alcohol is 12 ounces of beer, 5 ounces of wine, or 1½ ounces of liquor  · Lose weight, if needed  Being overweight increases your risk of certain health conditions  These include heart disease, high blood pressure, type 2 diabetes, and certain types of cancer  · Protect your skin  Do not sunbathe or use tanning beds  Use sunscreen with a SPF 15 or higher  Apply sunscreen at least 15 minutes before you go outside  Reapply sunscreen every 2 hours  Wear protective clothing, hats, and sunglasses when you are outside  · Drive safely  Always wear your seatbelt  Make sure everyone in your car wears a seatbelt  A seatbelt can save your life if you are in an accident  Do not use your cell phone when you are driving  This could distract you and cause an accident  Pull over if you need to make a call or send a text message  · Practice safe sex  Use latex condoms if are sexually active and have more than one partner  Your healthcare provider may recommend screening tests for sexually transmitted infections (STIs)  · Wear helmets, lifejackets, and protective gear  Always wear a helmet when you ride a bike or motorcycle, go skiing, or play sports that could cause a head injury  Wear protective equipment when you play sports  Wear a lifejacket when you are on a boat or doing water sports      © Copyright [employer] 2021 Information is for End User's use only and may not be sold, redistributed or otherwise used for commercial purposes  All illustrations and images included in CareNotes® are the copyrighted property of [employer]  or [employer]  The above information is an  only  It is not intended as medical advice for individual conditions or treatments  Talk to your doctor, nurse or pharmacist before following any medical regimen to see if it is safe and effective for you  Cholesterol and Your Health   AMBULATORY CARE:   Cholesterol  is a waxy, fat-like substance  Your body uses cholesterol to make hormones and new cells, and to protect nerves  Cholesterol is made by your body  It also comes from certain foods you eat, such as meat and dairy products  Your healthcare provider can help you set goals for your cholesterol levels  He or she can help you create a plan to meet your goals  Cholesterol level goals: Your cholesterol level goals depend on your risk for heart disease, your age, and your other health conditions  The following are general guidelines:  · Total cholesterol  includes low-density lipoprotein (LDL), high-density lipoprotein (HDL), and triglyceride levels  The total cholesterol level should be lower than 200 mg/dL and is best at about 150 mg/dL  · LDL cholesterol  is called bad cholesterol  because it forms plaque in your arteries  As plaque builds up, your arteries become narrow, and less blood flows through  When plaque decreases blood flow to your heart, you may have chest pain  If plaque completely blocks an artery that brings blood to your heart, you may have a heart attack  Plaque can break off and form blood clots  Blood clots may block arteries in your brain and cause a stroke  The level should be less than 130 mg/dL and is best at about 100 mg/dL  · HDL cholesterol  is called good cholesterol  because it helps remove LDL cholesterol from your arteries   It does this by attaching to LDL cholesterol and carrying it to your liver  Your liver breaks down LDL cholesterol so your body can get rid of it  High levels of HDL cholesterol can help prevent a heart attack and stroke  Low levels of HDL cholesterol can increase your risk for heart disease, heart attack, and stroke  The level should be 60 mg/dL or higher  · Triglycerides  are a type of fat that store energy from foods you eat  High levels of triglycerides also cause plaque buildup  This can increase your risk for a heart attack or stroke  If your triglyceride level is high, your LDL cholesterol level may also be high  The level should be less than 150 mg/dL  Any of the following can increase your risk for high cholesterol:   · Smoking cigarettes    · Being overweight or obese, or not getting enough exercise    · Drinking large amounts of alcohol    · A medical condition such as hypertension (high blood pressure) or diabetes    · Certain genes passed from your parents to you    · Age older than 65 years    What you need to know about having your cholesterol levels checked: Adults 21to 39years of age should have their cholesterol levels checked every 4 to 6 years  Adults 45 years or older should have their cholesterol checked every 1 to 2 years  You may need your cholesterol checked more often, or at a younger age, if you have risk factors for heart disease  You may also need to have your cholesterol checked more often if you have other health conditions, such as diabetes  Blood tests are used to check cholesterol levels  Blood tests measure your levels of triglycerides, LDL cholesterol, and HDL cholesterol  How healthy fats affect your cholesterol levels:  Healthy fats, also called unsaturated fats, help lower LDL cholesterol and triglyceride levels  Healthy fats include the following:  · Monounsaturated fats  are found in foods such as olive oil, canola oil, avocado, nuts, and olives      · Polyunsaturated fats,  such as omega 3 fats, are found in fish, such as salmon, trout, and tuna  They can also be found in plant foods such as flaxseed, walnuts, and soybeans  How unhealthy fats affect your cholesterol levels:  Unhealthy fats increase LDL cholesterol and triglyceride levels  They are found in foods high in cholesterol, saturated fat, and trans fat:  · Cholesterol  is found in eggs, dairy, and meat  · Saturated fat  is found in butter, cheese, ice cream, whole milk, and coconut oil  Saturated fat is also found in meat, such as sausage, hot dogs, and bologna  · Trans fat  is found in liquid oils and is used in fried and baked foods  Foods that contain trans fats include chips, crackers, muffins, sweet rolls, microwave popcorn, and cookies  Treatment  for high cholesterol will also decrease your risk of heart disease, heart attack, and stroke  Treatment may include any of the following:  · Lifestyle changes  may include food, exercise, weight loss, and quitting smoking  You may also need to decrease the amount of alcohol you drink  Your healthcare provider will want you to start with lifestyle changes  Other treatment may be added if lifestyle changes are not enough  Your healthcare provider may recommend you work with a team to manage hyperlipidemia  The team may include medical experts such as a dietitian, an exercise or physical therapist, and a behavior therapist  Your family members may be included in helping you create lifestyle changes  · Medicines  may be given to lower your LDL cholesterol, triglyceride levels, or total cholesterol level  You may need medicines to lower your cholesterol if any of the following is true:    ? You have a history of stroke, TIA, unstable angina, or a heart attack  ? Your LDL cholesterol level is 190 mg/dL or higher  ? You are age 36 to 76 years, have diabetes or heart disease risk factors, and your LDL cholesterol is 70 mg/dL or higher      · Supplements  include fish oil, red yeast rice, and garlic  Fish oil may help lower your triglyceride and LDL cholesterol levels  It may also increase your HDL cholesterol level  Red yeast rice may help decrease your total cholesterol level and LDL cholesterol level  Garlic may help lower your total cholesterol level  Do not take any supplements without talking to your healthcare provider  Food changes you can make to lower your cholesterol levels:  A dietitian can help you create a healthy eating plan  He or she can show you how to read food labels and choose foods low in saturated fat, trans fats, and cholesterol  · Decrease the total amount of fat you eat  Choose lean meats, fat-free or 1% fat milk, and low-fat dairy products, such as yogurt and cheese  Try to limit or avoid red meats  Limit or do not eat fried foods or baked goods, such as cookies  · Replace unhealthy fats with healthy fats  Cook foods in olive oil or canola oil  Choose soft margarines that are low in saturated fat and trans fat  Seeds, nuts, and avocados are other examples of healthy fats  · Eat foods with omega-3 fats  Examples include salmon, tuna, mackerel, walnuts, and flaxseed  Eat fish 2 times per week  Pregnant women should not eat fish that have high levels of mercury, such as shark, swordfish, and noris mackerel  · Increase the amount of high-fiber foods you eat  High-fiber foods can help lower your LDL cholesterol  Aim to get between 20 and 30 grams of fiber each day  Fruits and vegetables are high in fiber  Eat at least 5 servings each day  Other high-fiber foods are whole-grain or whole-wheat breads, pastas, or cereals, and brown rice  Eat 3 ounces of whole-grain foods each day  Increase fiber slowly  You may have abdominal discomfort, bloating, and gas if you add fiber to your diet too quickly  · Eat healthy protein foods  Examples include low-fat dairy products, skinless chicken and turkey, fish, and nuts      · Limit foods and drinks that are high in sugar  Your dietitian or healthcare provider can help you create daily limits for high-sugar foods and drinks  The limit may be lower if you have diabetes or another health condition  Limits can also help you lose weight if needed  Lifestyle changes you can make to lower your cholesterol levels:   · Maintain a healthy weight  Ask your healthcare provider what a healthy weight is for you  Ask him or her to help you create a weight loss plan if needed  Weight loss can decrease your total cholesterol and triglyceride levels  Weight loss may also help keep your blood pressure at a healthy level  · Be physically active throughout the day  Physical activity, such as exercise, can help lower your total cholesterol level and maintain a healthy weight  Physical activity can also help increase your HDL cholesterol level  Work with your healthcare provider to create an program that is right for you  Get at least 30 to 40 minutes of moderate physical activity most days of the week  Examples of exercise include brisk walking, swimming, or biking  Also include strength training at least 2 times each week  Your healthcare providers can help you create a physical activity plan  · Do not smoke  Nicotine and other chemicals in cigarettes and cigars can raise your cholesterol levels  Ask your healthcare provider for information if you currently smoke and need help to quit  E-cigarettes or smokeless tobacco still contain nicotine  Talk to your healthcare provider before you use these products  · Limit or do not drink alcohol  Alcohol can increase your triglyceride levels  Ask your healthcare provider before you drink alcohol  Ask how much is okay for you to drink in 24 hours or 1 week  Follow up with your doctor as directed:  Write down your questions so you remember to ask them during your visits    © Copyright [employer] 2021 Information is for End User's use only and may not be sold, redistributed or otherwise used for commercial purposes  All illustrations and images included in CareNotes® are the copyrighted property of [employer]  or [employer] Chung  The above information is an  only  It is not intended as medical advice for individual conditions or treatments  Talk to your doctor, nurse or pharmacist before following any medical regimen to see if it is safe and effective for you  Please contact your insurance if you are uncertain of coverage for plan of care items  Your insurance may not cover the cost of your Vitamin D blood test, which is approximately $65-70    Please notify the lab prior to blood draw if you would like to decline this test

## 2021-12-25 ENCOUNTER — NURSE TRIAGE (OUTPATIENT)
Dept: OTHER | Facility: OTHER | Age: 26
End: 2021-12-25

## 2021-12-25 DIAGNOSIS — Z20.828 SARS-ASSOCIATED CORONAVIRUS EXPOSURE: Primary | ICD-10-CM

## 2021-12-26 PROCEDURE — 87636 SARSCOV2 & INF A&B AMP PRB: CPT | Performed by: FAMILY MEDICINE

## 2021-12-27 LAB
FLUAV RNA RESP QL NAA+PROBE: NEGATIVE
FLUBV RNA RESP QL NAA+PROBE: NEGATIVE
SARS-COV-2 RNA RESP QL NAA+PROBE: POSITIVE

## 2023-08-31 ENCOUNTER — OFFICE VISIT (OUTPATIENT)
Dept: FAMILY MEDICINE CLINIC | Facility: CLINIC | Age: 28
End: 2023-08-31
Payer: COMMERCIAL

## 2023-08-31 VITALS
BODY MASS INDEX: 27.19 KG/M2 | WEIGHT: 183.6 LBS | HEIGHT: 69 IN | OXYGEN SATURATION: 98 % | SYSTOLIC BLOOD PRESSURE: 134 MMHG | DIASTOLIC BLOOD PRESSURE: 82 MMHG | HEART RATE: 84 BPM

## 2023-08-31 DIAGNOSIS — Z13.220 ENCOUNTER FOR SCREENING FOR LIPID DISORDER: ICD-10-CM

## 2023-08-31 DIAGNOSIS — Z11.59 ENCOUNTER FOR HEPATITIS C SCREENING TEST FOR LOW RISK PATIENT: ICD-10-CM

## 2023-08-31 DIAGNOSIS — Z11.3 ROUTINE SCREENING FOR STI (SEXUALLY TRANSMITTED INFECTION): ICD-10-CM

## 2023-08-31 DIAGNOSIS — Z11.4 ENCOUNTER FOR SCREENING FOR HIV: ICD-10-CM

## 2023-08-31 DIAGNOSIS — R22.32 SKIN LUMP OF ARM, LEFT: ICD-10-CM

## 2023-08-31 DIAGNOSIS — Z00.00 ANNUAL PHYSICAL EXAM: Primary | ICD-10-CM

## 2023-08-31 PROBLEM — R21 RASH: Status: ACTIVE | Noted: 2023-08-31

## 2023-08-31 PROBLEM — L85.3 DRY SKIN: Status: ACTIVE | Noted: 2023-08-31

## 2023-08-31 PROCEDURE — 99385 PREV VISIT NEW AGE 18-39: CPT | Performed by: FAMILY MEDICINE

## 2023-08-31 NOTE — PROGRESS NOTES
[facility]ACT[facility]    NAME: Citlali Canela  AGE: 25 y.o. SEX: male  : 1995     DATE: 2023     Assessment and Plan:     Problem List Items Addressed This Visit        Other    Skin lump of arm, left    Relevant Orders    US MSK limited   Other Visit Diagnoses     Annual physical exam    -  Primary    Relevant Orders    Comprehensive metabolic panel    Encounter for screening for lipid disorder        Relevant Orders    Lipid panel    Encounter for hepatitis C screening test for low risk patient        Relevant Orders    Hepatitis C antibody    Encounter for screening for HIV        Relevant Orders    : HIV 1/2 AB/AG w Reflex [facility] for 2 yr old and above    Routine screening for STI (sexually transmitted infection)        Relevant Orders    Chlamydia/GC amplified DNA by PCR    RPR-Syphilis Screening (Total Syphilis IGG/IGM)    Hepatitis B surface antigen          Immunizations and preventive care screenings were discussed with patient today. Appropriate education was printed on patient's after visit summary. Counseling:  Alcohol/drug use: discussed moderation in alcohol intake, the recommendations for healthy alcohol use, and avoidance of illicit drug use. Dental Health: discussed importance of regular tooth brushing, flossing, and dental visits. Injury prevention: discussed safety/seat belts, safety helmets, smoke detectors, carbon dioxide detectors, and smoking near bedding or upholstery. Sexual health: discussed sexually transmitted diseases, partner selection, use of condoms, avoidance of unintended pregnancy, and contraceptive alternatives. · Exercise: the importance of regular exercise/physical activity was discussed. Recommend exercise 3-5 times per week for at least 30 minutes. BMI Counseling: Body mass index is 27.11 kg/m².  The BMI is above normal. Nutrition recommendations include encouraging healthy choices of fruits and vegetables, limiting drinks that contain sugar, moderation in carbohydrate intake and increasing intake of lean protein. Exercise recommendations include moderate physical activity 150 minutes/week and strength training exercises. No pharmacotherapy was ordered. Patient referred to PCP. Rationale for BMI follow-up plan is due to patient being overweight or obese. Depression Screening and Follow-up Plan: Patient was screened for depression during today's encounter. They screened negative with a PHQ-2 score of 0. No follow-ups on file. Chief Complaint:     Chief Complaint   Patient presents with   • Eczema     Right hand   • Rash     Right hand two fingers, side and shoulder      History of Present Illness:     Adult Annual Physical   Patient here for a comprehensive physical exam. The patient reports no problems. Diet and Physical Activity  · Diet/Nutrition: well balanced diet, limited junk food and consuming 3-5 servings of fruits/vegetables daily. · Exercise: moderate cardiovascular exercise and strength training exercises. Depression Screening  PHQ-2/9 Depression Screening    Little interest or pleasure in doing things: 0 - not at all  Feeling down, depressed, or hopeless: 0 - not at all  PHQ-2 Score: 0  PHQ-2 Interpretation: Negative depression screen       General Health  · Sleep: gets 4-6 hours of sleep on average. · Hearing: normal - bilateral.  · Vision: goes for regular eye exams and wears glasses. · Dental: regular dental visits.  Health  · History of STDs?: no.     Review of Systems:     Review of Systems   Constitutional: Negative for chills and fever. HENT: Negative for ear pain and sore throat. Eyes: Negative for pain and visual disturbance. Respiratory: Negative for cough and shortness of breath. Cardiovascular: Negative for chest pain and palpitations. Gastrointestinal: Negative for abdominal pain and vomiting.    Genitourinary: Negative for dysuria and hematuria. Musculoskeletal: Negative for arthralgias and back pain. Skin: Negative for color change and rash. Skin lump on L arm   Neurological: Negative for seizures and syncope. Psychiatric/Behavioral: Negative for confusion, sleep disturbance and suicidal ideas. The patient is not nervous/anxious. All other systems reviewed and are negative.      Past Medical History:     Past Medical History:   Diagnosis Date   • COVID-19 12/26/2021   • No pertinent past medical history       Past Surgical History:     Past Surgical History:   Procedure Laterality Date   • WISDOM TOOTH EXTRACTION      x 1      Social History:     Social History     Socioeconomic History   • Marital status: Single     Spouse name: None   • Number of children: 1   • Years of education: None   • Highest education level: None   Occupational History   • Occupation: Social Media Content Creator     Employer: [employer]   Tobacco Use   • Smoking status: Never   • Smokeless tobacco: Never   Vaping Use   • Vaping Use: Never used   Substance and Sexual Activity   • Alcohol use: Not Currently   • Drug use: No   • Sexual activity: Yes     Partners: Female     Birth control/protection: Condom Male   Other Topics Concern   • None   Social History Narrative        Lives with wife and 2 sons    2 Children - 2 Sons    Social Media Content Creator     Social Determinants of Health     Financial Resource Strain: Not on file   Food Insecurity: Not on file   Transportation Needs: Not on file   Physical Activity: Not on file   Stress: Not on file   Social Connections: Not on file   Intimate Partner Violence: Not on file   Housing Stability: Not on file      Family History:     Family History   Problem Relation Age of Onset   • No Known Problems Mother    • No Known Problems Father    • No Known Problems Sister    • No Known Problems Brother    • No Known Problems Son    • No Known Problems Son    • No Known Problems Maternal Grandmother    • No Known Problems Maternal Grandfather    • No Known Problems Paternal Grandmother    • No Known Problems Paternal Grandfather       Current Medications:     No current outpatient medications on file. No current facility-administered medications for this visit. Allergies:     No Known Allergies   Physical Exam:     /82   Pulse 84   Ht 5' 9" (1.753 m)   Wt 83.3 kg (183 lb 9.6 oz)   SpO2 98%   BMI 27.11 kg/m²     Physical Exam  Vitals and nursing note reviewed. Constitutional:       General: He is not in acute distress. Appearance: Normal appearance. HENT:      Head: Normocephalic and atraumatic. Right Ear: Tympanic membrane and external ear normal.      Left Ear: Tympanic membrane and external ear normal.      Nose: Nose normal.      Mouth/Throat:      Mouth: Mucous membranes are moist.   Eyes:      Extraocular Movements: Extraocular movements intact. Conjunctiva/sclera: Conjunctivae normal.      Pupils: Pupils are equal, round, and reactive to light. Cardiovascular:      Rate and Rhythm: Normal rate and regular rhythm. Pulses: Normal pulses. Heart sounds: Normal heart sounds. No murmur heard. Pulmonary:      Effort: Pulmonary effort is normal.      Breath sounds: Normal breath sounds. No wheezing, rhonchi or rales. Abdominal:      General: Bowel sounds are normal.      Palpations: Abdomen is soft. Tenderness: There is no abdominal tenderness. There is no guarding. Musculoskeletal:         General: Normal range of motion. Cervical back: Normal range of motion. Right lower leg: No edema. Left lower leg: No edema. Lymphadenopathy:      Cervical: No cervical adenopathy. Skin:     General: Skin is warm. Capillary Refill: Capillary refill takes less than 2 seconds. Neurological:      General: No focal deficit present. Mental Status: He is alert and oriented to person, place, and time.    Psychiatric: Mood and Affect: Mood normal.         Behavior: Behavior normal.          Chris Dorantes, DO   [address]

## 2024-05-15 ENCOUNTER — OFFICE VISIT (OUTPATIENT)
Dept: FAMILY MEDICINE CLINIC | Facility: CLINIC | Age: 29
End: 2024-05-15
Payer: COMMERCIAL

## 2024-05-15 VITALS
WEIGHT: 191.8 LBS | DIASTOLIC BLOOD PRESSURE: 72 MMHG | HEART RATE: 73 BPM | RESPIRATION RATE: 16 BRPM | SYSTOLIC BLOOD PRESSURE: 120 MMHG | OXYGEN SATURATION: 99 % | BODY MASS INDEX: 28.41 KG/M2 | TEMPERATURE: 97.9 F | HEIGHT: 69 IN

## 2024-05-15 DIAGNOSIS — J30.9 ALLERGIC RHINITIS, UNSPECIFIED SEASONALITY, UNSPECIFIED TRIGGER: Primary | ICD-10-CM

## 2024-05-15 DIAGNOSIS — R42 DIZZINESS: ICD-10-CM

## 2024-05-15 PROCEDURE — 99213 OFFICE O/P EST LOW 20 MIN: CPT | Performed by: FAMILY MEDICINE

## 2024-05-15 RX ORDER — MECLIZINE HYDROCHLORIDE 25 MG/1
25 TABLET ORAL 3 TIMES DAILY PRN
Qty: 30 TABLET | Refills: 0 | Status: SHIPPED | OUTPATIENT
Start: 2024-05-15

## 2024-05-15 RX ORDER — AZELASTINE 1 MG/ML
1 SPRAY, METERED NASAL 2 TIMES DAILY
Qty: 1 ML | Refills: 1 | Status: SHIPPED | OUTPATIENT
Start: 2024-05-15

## 2024-05-15 NOTE — PROGRESS NOTES
Ambulatory Visit  Name: Zafar Woodward      : 1995      MRN: [id number]  Encounter Provider: Juan Jose Petersen DO  Encounter Date: 5/15/2024   Encounter department: [facility]    Assessment & Plan   1. Allergic rhinitis, unspecified seasonality, unspecified trigger  -     azelastine (ASTELIN) 0.1 % nasal spray; 1 spray into each nostril 2 (two) times a day Use in each nostril as directed  2. Dizziness  Assessment & Plan:  - Noted lightheadedness and dizziness over the past 2 days.  Does admit to some increased congestion and fullness likely due to seasonal allergies.  Denies any episodes of room spinning.  Denies palpitations, chest pains, shortness of breath, wheezing.  Denies fevers, chills, nausea, diarrhea.  -Does admit he has been eating per his normal.  He is trying to get enough fluids.  We did discuss adequate hydration for goal of 64 to 80 ounces of water per day.  -Patient to start azelastine nasal spray for rhinitis as above.  -Meclizine provided for dizziness.  -Follow-up as needed  Orders:  -     meclizine (ANTIVERT) 25 mg tablet; Take 1 tablet (25 mg total) by mouth 3 (three) times a day as needed for dizziness         History of Present Illness     Zafar is a 29-year-old male who presents today for evaluation of feeling like his ears are clogged, lightheadedness and dizziness.  He notes this happened a couple times over the past few days where he feels like his head is floating.  Denies any ear pain, difficulty hearing.  Does admit to some increased congestion and runny nose.  Does admit to some postnasal drip.  He did start allergy medicine yesterday and feels like maybe has improved slightly.  Denies any fevers, chills, nausea vomiting, diarrhea.  Denies any unwanted weight loss or night sweats.  He has been tolerating oral intake.  He is trying to drink fluids and hydrate.  Denies any known sick contacts.  No shortness of breath, palpitations, wheezing.      Review of  Systems   Constitutional:  Negative for chills and fever.   HENT:  Positive for congestion, postnasal drip and rhinorrhea. Negative for ear pain, sinus pressure, sinus pain, sore throat, tinnitus and trouble swallowing.    Eyes:  Negative for pain and visual disturbance.   Respiratory:  Negative for cough, chest tightness, shortness of breath and wheezing.    Cardiovascular:  Negative for chest pain and palpitations.   Gastrointestinal:  Negative for abdominal pain, constipation, diarrhea, nausea and vomiting.   Genitourinary:  Negative for dysuria and hematuria.   Musculoskeletal:  Negative for arthralgias and back pain.   Skin:  Negative for color change and rash.   Neurological:  Positive for dizziness and light-headedness. Negative for seizures, syncope and headaches.   Psychiatric/Behavioral:  Negative for confusion and sleep disturbance. The patient is not nervous/anxious.    All other systems reviewed and are negative.    Past Medical History:   Diagnosis Date    COVID-19 12/26/2021    No pertinent past medical history      Past Surgical History:   Procedure Laterality Date    WISDOM TOOTH EXTRACTION      x 1     Family History   Problem Relation Age of Onset    No Known Problems Mother     No Known Problems Father     No Known Problems Sister     No Known Problems Brother     No Known Problems Son     No Known Problems Son     No Known Problems Maternal Grandmother     No Known Problems Maternal Grandfather     No Known Problems Paternal Grandmother     No Known Problems Paternal Grandfather      Social History     Tobacco Use    Smoking status: Never    Smokeless tobacco: Never   Vaping Use    Vaping status: Never Used   Substance and Sexual Activity    Alcohol use: Not Currently    Drug use: No    Sexual activity: Yes     Partners: Female     Birth control/protection: Condom Male     No current outpatient medications on file prior to visit.     No Known Allergies  Immunization History   Administered  "Date(s) Administered    COVID-19 PFIZER VACCINE 0.3 ML IM 06/05/2021, 06/30/2021    Tdap 09/25/2017     Objective     /72 (BP Location: Left arm, Patient Position: Sitting, Cuff Size: Standard)   Pulse 73   Temp 97.9 °F (36.6 °C) (Tympanic)   Resp 16   Ht 5' 9\" (1.753 m)   Wt 87 kg (191 lb 12.8 oz)   SpO2 99%   BMI 28.32 kg/m²     Physical Exam  Vitals and nursing note reviewed.   Constitutional:       General: He is not in acute distress.     Appearance: Normal appearance.   HENT:      Head: Normocephalic and atraumatic.      Right Ear: Tympanic membrane and external ear normal.      Left Ear: Tympanic membrane and external ear normal.      Nose: Congestion and rhinorrhea present.      Mouth/Throat:      Mouth: Mucous membranes are moist.   Eyes:      Extraocular Movements: Extraocular movements intact.      Conjunctiva/sclera: Conjunctivae normal.      Pupils: Pupils are equal, round, and reactive to light.   Cardiovascular:      Rate and Rhythm: Normal rate and regular rhythm.      Pulses: Normal pulses.      Heart sounds: Normal heart sounds. No murmur heard.  Pulmonary:      Effort: Pulmonary effort is normal.      Breath sounds: Normal breath sounds. No wheezing, rhonchi or rales.   Abdominal:      General: Bowel sounds are normal.      Palpations: Abdomen is soft.      Tenderness: There is no abdominal tenderness. There is no guarding.   Musculoskeletal:         General: Normal range of motion.      Cervical back: Normal range of motion.      Right lower leg: No edema.      Left lower leg: No edema.   Lymphadenopathy:      Cervical: No cervical adenopathy.   Skin:     General: Skin is warm.      Capillary Refill: Capillary refill takes less than 2 seconds.   Neurological:      General: No focal deficit present.      Mental Status: He is alert and oriented to person, place, and time.   Psychiatric:         Mood and Affect: Mood normal.         Behavior: Behavior normal.       Administrative " Statements   I have spent a total time of 23 minutes on 05/21/24 In caring for this patient including Diagnostic results, Instructions for management, Patient and family education, Risk factor reductions, Counseling / Coordination of care, Documenting in the medical record, and Reviewing / ordering tests, medicine, procedures  .

## 2024-05-21 PROBLEM — R42 DIZZINESS: Status: ACTIVE | Noted: 2024-05-21

## 2024-05-21 NOTE — ASSESSMENT & PLAN NOTE
- Noted lightheadedness and dizziness over the past 2 days.  Does admit to some increased congestion and fullness likely due to seasonal allergies.  Denies any episodes of room spinning.  Denies palpitations, chest pains, shortness of breath, wheezing.  Denies fevers, chills, nausea, diarrhea.  -Does admit he has been eating per his normal.  He is trying to get enough fluids.  We did discuss adequate hydration for goal of 64 to 80 ounces of water per day.  -Patient to start azelastine nasal spray for rhinitis as above.  -Meclizine provided for dizziness.  -Follow-up as needed

## 2024-08-30 ENCOUNTER — HOSPITAL ENCOUNTER (INPATIENT)
Facility: HOSPITAL | Age: 29
LOS: 3 days | Discharge: HOME | DRG: 872 | End: 2024-09-03
Attending: EMERGENCY MEDICINE | Admitting: INTERNAL MEDICINE
Payer: COMMERCIAL

## 2024-08-30 DIAGNOSIS — A41.9 SEPSIS (HCC): Primary | ICD-10-CM

## 2024-08-30 DIAGNOSIS — L03.115 CELLULITIS OF RIGHT LEG: ICD-10-CM

## 2024-08-30 LAB
ATRIAL RATE: 91 BPM
P AXIS: 67 DEGREES
PR INTERVAL: 132 MS
QRS AXIS: 65 DEGREES
QRSD INTERVAL: 82 MS
QT INTERVAL: 338 MS
QTC INTERVAL: 415 MS
T WAVE AXIS: 58 DEGREES
VENTRICULAR RATE: 91 BPM

## 2024-08-30 PROCEDURE — 85610 PROTHROMBIN TIME: CPT | Performed by: PHYSICIAN ASSISTANT

## 2024-08-30 PROCEDURE — 83880 ASSAY OF NATRIURETIC PEPTIDE: CPT | Performed by: NURSE PRACTITIONER

## 2024-08-30 PROCEDURE — 85730 THROMBOPLASTIN TIME PARTIAL: CPT | Performed by: PHYSICIAN ASSISTANT

## 2024-08-30 PROCEDURE — 86618 LYME DISEASE ANTIBODY: CPT | Performed by: PHYSICIAN ASSISTANT

## 2024-08-30 PROCEDURE — 96365 THER/PROPH/DIAG IV INF INIT: CPT

## 2024-08-30 PROCEDURE — 99285 EMERGENCY DEPT VISIT HI MDM: CPT

## 2024-08-30 PROCEDURE — 87040 BLOOD CULTURE FOR BACTERIA: CPT | Performed by: PHYSICIAN ASSISTANT

## 2024-08-30 PROCEDURE — 36415 COLL VENOUS BLD VENIPUNCTURE: CPT | Performed by: PHYSICIAN ASSISTANT

## 2024-08-30 PROCEDURE — 83605 ASSAY OF LACTIC ACID: CPT | Performed by: PHYSICIAN ASSISTANT

## 2024-08-30 PROCEDURE — 80053 COMPREHEN METABOLIC PANEL: CPT | Performed by: PHYSICIAN ASSISTANT

## 2024-08-30 PROCEDURE — 85027 COMPLETE CBC AUTOMATED: CPT | Performed by: PHYSICIAN ASSISTANT

## 2024-08-30 PROCEDURE — 83735 ASSAY OF MAGNESIUM: CPT | Performed by: NURSE PRACTITIONER

## 2024-08-30 PROCEDURE — 84145 PROCALCITONIN (PCT): CPT | Performed by: PHYSICIAN ASSISTANT

## 2024-08-30 PROCEDURE — 96375 TX/PRO/DX INJ NEW DRUG ADDON: CPT

## 2024-08-30 PROCEDURE — 93005 ELECTROCARDIOGRAM TRACING: CPT

## 2024-08-30 PROCEDURE — 86140 C-REACTIVE PROTEIN: CPT | Performed by: PHYSICIAN ASSISTANT

## 2024-08-30 PROCEDURE — 85007 BL SMEAR W/DIFF WBC COUNT: CPT | Performed by: PHYSICIAN ASSISTANT

## 2024-08-30 RX ORDER — ACETAMINOPHEN 10 MG/ML
1000 INJECTION, SOLUTION INTRAVENOUS ONCE
Status: COMPLETED | OUTPATIENT
Start: 2024-08-30 | End: 2024-08-31

## 2024-08-30 RX ORDER — ONDANSETRON 2 MG/ML
4 INJECTION INTRAMUSCULAR; INTRAVENOUS ONCE
Status: COMPLETED | OUTPATIENT
Start: 2024-08-31 | End: 2024-08-30

## 2024-08-30 RX ORDER — FAMOTIDINE 10 MG/ML
20 INJECTION, SOLUTION INTRAVENOUS ONCE
Status: COMPLETED | OUTPATIENT
Start: 2024-08-31 | End: 2024-08-30

## 2024-08-30 RX ADMIN — ACETAMINOPHEN 1000 MG: 10 INJECTION INTRAVENOUS at 23:58

## 2024-08-30 RX ADMIN — ONDANSETRON 4 MG: 2 INJECTION INTRAMUSCULAR; INTRAVENOUS at 23:57

## 2024-08-30 RX ADMIN — FAMOTIDINE 20 MG: 10 INJECTION INTRAVENOUS at 23:57

## 2024-08-30 RX ADMIN — SODIUM CHLORIDE 1000 ML: 0.9 INJECTION, SOLUTION INTRAVENOUS at 23:49

## 2024-08-30 RX ADMIN — SODIUM CHLORIDE 1000 ML: 0.9 INJECTION, SOLUTION INTRAVENOUS at 23:51

## 2024-08-31 ENCOUNTER — APPOINTMENT (EMERGENCY)
Dept: RADIOLOGY | Facility: HOSPITAL | Age: 29
DRG: 872 | End: 2024-08-31
Payer: COMMERCIAL

## 2024-08-31 PROBLEM — I48.91 NEW ONSET ATRIAL FIBRILLATION (HCC): Status: ACTIVE | Noted: 2024-08-31

## 2024-08-31 PROBLEM — A41.9 SEPSIS (HCC): Status: ACTIVE | Noted: 2024-08-31

## 2024-08-31 PROBLEM — L03.115 CELLULITIS OF RIGHT LOWER EXTREMITY: Status: ACTIVE | Noted: 2024-08-31

## 2024-08-31 PROBLEM — R55 VASOVAGAL SYNCOPE: Status: ACTIVE | Noted: 2024-08-31

## 2024-08-31 LAB
ALBUMIN SERPL BCG-MCNC: 4 G/DL (ref 3.5–5)
ALP SERPL-CCNC: 68 U/L (ref 34–104)
ALT SERPL W P-5'-P-CCNC: 17 U/L (ref 7–52)
ANION GAP SERPL CALCULATED.3IONS-SCNC: 5 MMOL/L (ref 4–13)
ANION GAP SERPL CALCULATED.3IONS-SCNC: 6 MMOL/L (ref 4–13)
APTT PPP: 28 SECONDS (ref 23–34)
AST SERPL W P-5'-P-CCNC: 12 U/L (ref 13–39)
ATRIAL RATE: 288 BPM
B BURGDOR IGG+IGM SER QL IA: NEGATIVE
BASOPHILS # BLD AUTO: 0.02 THOUSANDS/ÂΜL (ref 0–0.1)
BASOPHILS # BLD MANUAL: 0 THOUSAND/UL (ref 0–0.1)
BASOPHILS NFR BLD AUTO: 0 % (ref 0–1)
BASOPHILS NFR MAR MANUAL: 0 % (ref 0–1)
BILIRUB SERPL-MCNC: 0.91 MG/DL (ref 0.2–1)
BILIRUB UR QL STRIP: NEGATIVE
BNP SERPL-MCNC: 71 PG/ML (ref 0–100)
BUN SERPL-MCNC: 10 MG/DL (ref 5–25)
BUN SERPL-MCNC: 12 MG/DL (ref 5–25)
CALCIUM SERPL-MCNC: 7.6 MG/DL (ref 8.4–10.2)
CALCIUM SERPL-MCNC: 8.7 MG/DL (ref 8.4–10.2)
CARDIAC TROPONIN I PNL SERPL HS: 5 NG/L (ref 8–18)
CARDIAC TROPONIN I PNL SERPL HS: <2 NG/L (ref 8–18)
CHLORIDE SERPL-SCNC: 100 MMOL/L (ref 96–108)
CHLORIDE SERPL-SCNC: 109 MMOL/L (ref 96–108)
CLARITY UR: CLEAR
CO2 SERPL-SCNC: 24 MMOL/L (ref 21–32)
CO2 SERPL-SCNC: 29 MMOL/L (ref 21–32)
COLOR UR: NORMAL
CREAT SERPL-MCNC: 0.82 MG/DL (ref 0.6–1.3)
CREAT SERPL-MCNC: 0.91 MG/DL (ref 0.6–1.3)
CRP SERPL QL: 88.6 MG/L
EOSINOPHIL # BLD AUTO: 0 THOUSAND/ÂΜL (ref 0–0.61)
EOSINOPHIL # BLD MANUAL: 0.53 THOUSAND/UL (ref 0–0.4)
EOSINOPHIL NFR BLD AUTO: 0 % (ref 0–6)
EOSINOPHIL NFR BLD MANUAL: 3 % (ref 0–6)
ERYTHROCYTE [DISTWIDTH] IN BLOOD BY AUTOMATED COUNT: 12.1 % (ref 11.6–15.1)
ERYTHROCYTE [DISTWIDTH] IN BLOOD BY AUTOMATED COUNT: 12.4 % (ref 11.6–15.1)
GFR SERPL CREATININE-BSD FRML MDRD: 111 ML/MIN/1.73SQ M
GFR SERPL CREATININE-BSD FRML MDRD: 117 ML/MIN/1.73SQ M
GLUCOSE SERPL-MCNC: 113 MG/DL (ref 65–140)
GLUCOSE SERPL-MCNC: 131 MG/DL (ref 65–140)
GLUCOSE UR STRIP-MCNC: NEGATIVE MG/DL
HCT VFR BLD AUTO: 39.6 % (ref 36.5–49.3)
HCT VFR BLD AUTO: 42.1 % (ref 36.5–49.3)
HGB BLD-MCNC: 13.8 G/DL (ref 12–17)
HGB BLD-MCNC: 14.8 G/DL (ref 12–17)
HGB UR QL STRIP.AUTO: NEGATIVE
IMM GRANULOCYTES # BLD AUTO: 0.07 THOUSAND/UL (ref 0–0.2)
IMM GRANULOCYTES NFR BLD AUTO: 1 % (ref 0–2)
INR PPP: 1.31 (ref 0.85–1.19)
KETONES UR STRIP-MCNC: NEGATIVE MG/DL
LACTATE SERPL-SCNC: 1 MMOL/L (ref 0.5–2)
LEUKOCYTE ESTERASE UR QL STRIP: NEGATIVE
LYMPHOCYTES # BLD AUTO: 1.26 THOUSANDS/ÂΜL (ref 0.6–4.47)
LYMPHOCYTES # BLD AUTO: 1.41 THOUSAND/UL (ref 0.6–4.47)
LYMPHOCYTES # BLD AUTO: 8 % (ref 14–44)
LYMPHOCYTES NFR BLD AUTO: 9 % (ref 14–44)
MAGNESIUM SERPL-MCNC: 1.9 MG/DL (ref 1.9–2.7)
MAGNESIUM SERPL-MCNC: 2.8 MG/DL (ref 1.9–2.7)
MCH RBC QN AUTO: 30.3 PG (ref 26.8–34.3)
MCH RBC QN AUTO: 30.5 PG (ref 26.8–34.3)
MCHC RBC AUTO-ENTMCNC: 34.8 G/DL (ref 31.4–37.4)
MCHC RBC AUTO-ENTMCNC: 35.2 G/DL (ref 31.4–37.4)
MCV RBC AUTO: 86 FL (ref 82–98)
MCV RBC AUTO: 87 FL (ref 82–98)
MONOCYTES # BLD AUTO: 0.69 THOUSAND/ÂΜL (ref 0.17–1.22)
MONOCYTES # BLD AUTO: 1.76 THOUSAND/UL (ref 0–1.22)
MONOCYTES NFR BLD AUTO: 5 % (ref 4–12)
MONOCYTES NFR BLD: 10 % (ref 4–12)
NEUTROPHILS # BLD AUTO: 11.64 THOUSANDS/ÂΜL (ref 1.85–7.62)
NEUTROPHILS # BLD MANUAL: 13.9 THOUSAND/UL (ref 1.85–7.62)
NEUTS BAND NFR BLD MANUAL: 5 % (ref 0–8)
NEUTS SEG NFR BLD AUTO: 74 % (ref 43–75)
NEUTS SEG NFR BLD AUTO: 85 % (ref 43–75)
NITRITE UR QL STRIP: NEGATIVE
NRBC BLD AUTO-RTO: 0 /100 WBCS
PH UR STRIP.AUTO: 7 [PH]
PLATELET # BLD AUTO: 181 THOUSANDS/UL (ref 149–390)
PLATELET # BLD AUTO: 203 THOUSANDS/UL (ref 149–390)
PLATELET BLD QL SMEAR: ADEQUATE
PMV BLD AUTO: 9.3 FL (ref 8.9–12.7)
PMV BLD AUTO: 9.4 FL (ref 8.9–12.7)
POTASSIUM SERPL-SCNC: 3.8 MMOL/L (ref 3.5–5.3)
POTASSIUM SERPL-SCNC: 3.9 MMOL/L (ref 3.5–5.3)
PROCALCITONIN SERPL-MCNC: 0.61 NG/ML
PROCALCITONIN SERPL-MCNC: 0.63 NG/ML
PROT SERPL-MCNC: 6.3 G/DL (ref 6.4–8.4)
PROT UR STRIP-MCNC: NEGATIVE MG/DL
PROTHROMBIN TIME: 17 SECONDS (ref 12.3–15)
QRS AXIS: 70 DEGREES
QRSD INTERVAL: 82 MS
QT INTERVAL: 304 MS
QTC INTERVAL: 431 MS
RBC # BLD AUTO: 4.53 MILLION/UL (ref 3.88–5.62)
RBC # BLD AUTO: 4.88 MILLION/UL (ref 3.88–5.62)
RBC MORPH BLD: NORMAL
SODIUM SERPL-SCNC: 135 MMOL/L (ref 135–147)
SODIUM SERPL-SCNC: 138 MMOL/L (ref 135–147)
SP GR UR STRIP.AUTO: 1.01 (ref 1–1.03)
T WAVE AXIS: 51 DEGREES
UROBILINOGEN UR STRIP-ACNC: <2 MG/DL
VENTRICULAR RATE: 121 BPM
WBC # BLD AUTO: 13.68 THOUSAND/UL (ref 4.31–10.16)
WBC # BLD AUTO: 17.59 THOUSAND/UL (ref 4.31–10.16)

## 2024-08-31 PROCEDURE — 87081 CULTURE SCREEN ONLY: CPT | Performed by: INTERNAL MEDICINE

## 2024-08-31 PROCEDURE — 99232 SBSQ HOSP IP/OBS MODERATE 35: CPT | Performed by: PHYSICIAN ASSISTANT

## 2024-08-31 PROCEDURE — 99223 1ST HOSP IP/OBS HIGH 75: CPT | Performed by: INTERNAL MEDICINE

## 2024-08-31 PROCEDURE — 99222 1ST HOSP IP/OBS MODERATE 55: CPT | Performed by: FAMILY MEDICINE

## 2024-08-31 PROCEDURE — 93005 ELECTROCARDIOGRAM TRACING: CPT

## 2024-08-31 PROCEDURE — 83735 ASSAY OF MAGNESIUM: CPT | Performed by: NURSE PRACTITIONER

## 2024-08-31 PROCEDURE — 99285 EMERGENCY DEPT VISIT HI MDM: CPT | Performed by: PHYSICIAN ASSISTANT

## 2024-08-31 PROCEDURE — 36415 COLL VENOUS BLD VENIPUNCTURE: CPT | Performed by: NURSE PRACTITIONER

## 2024-08-31 PROCEDURE — 80048 BASIC METABOLIC PNL TOTAL CA: CPT | Performed by: NURSE PRACTITIONER

## 2024-08-31 PROCEDURE — 81003 URINALYSIS AUTO W/O SCOPE: CPT | Performed by: PHYSICIAN ASSISTANT

## 2024-08-31 PROCEDURE — 84484 ASSAY OF TROPONIN QUANT: CPT | Performed by: NURSE PRACTITIONER

## 2024-08-31 PROCEDURE — 84145 PROCALCITONIN (PCT): CPT | Performed by: NURSE PRACTITIONER

## 2024-08-31 PROCEDURE — 71045 X-RAY EXAM CHEST 1 VIEW: CPT

## 2024-08-31 PROCEDURE — 73552 X-RAY EXAM OF FEMUR 2/>: CPT

## 2024-08-31 PROCEDURE — 96365 THER/PROPH/DIAG IV INF INIT: CPT

## 2024-08-31 PROCEDURE — 85025 COMPLETE CBC W/AUTO DIFF WBC: CPT | Performed by: NURSE PRACTITIONER

## 2024-08-31 RX ORDER — SODIUM CHLORIDE, SODIUM GLUCONATE, SODIUM ACETATE, POTASSIUM CHLORIDE, MAGNESIUM CHLORIDE, SODIUM PHOSPHATE, DIBASIC, AND POTASSIUM PHOSPHATE .53; .5; .37; .037; .03; .012; .00082 G/100ML; G/100ML; G/100ML; G/100ML; G/100ML; G/100ML; G/100ML
500 INJECTION, SOLUTION INTRAVENOUS ONCE
Status: COMPLETED | OUTPATIENT
Start: 2024-08-31 | End: 2024-08-31

## 2024-08-31 RX ORDER — ENOXAPARIN SODIUM 100 MG/ML
40 INJECTION SUBCUTANEOUS DAILY
Status: DISCONTINUED | OUTPATIENT
Start: 2024-08-31 | End: 2024-09-03 | Stop reason: HOSPADM

## 2024-08-31 RX ORDER — DOXYCYCLINE 100 MG/1
100 CAPSULE ORAL EVERY 12 HOURS SCHEDULED
Status: DISCONTINUED | OUTPATIENT
Start: 2024-08-31 | End: 2024-09-02

## 2024-08-31 RX ORDER — SODIUM CHLORIDE, SODIUM LACTATE, POTASSIUM CHLORIDE, CALCIUM CHLORIDE 600; 310; 30; 20 MG/100ML; MG/100ML; MG/100ML; MG/100ML
125 INJECTION, SOLUTION INTRAVENOUS CONTINUOUS
Status: DISCONTINUED | OUTPATIENT
Start: 2024-08-31 | End: 2024-09-02

## 2024-08-31 RX ORDER — POTASSIUM CHLORIDE 1500 MG/1
40 TABLET, EXTENDED RELEASE ORAL ONCE
Status: COMPLETED | OUTPATIENT
Start: 2024-08-31 | End: 2024-08-31

## 2024-08-31 RX ORDER — DIPHENHYDRAMINE HYDROCHLORIDE 50 MG/ML
25 INJECTION INTRAMUSCULAR; INTRAVENOUS EVERY 6 HOURS PRN
Status: DISCONTINUED | OUTPATIENT
Start: 2024-08-31 | End: 2024-09-03 | Stop reason: HOSPADM

## 2024-08-31 RX ORDER — METOPROLOL TARTRATE 1 MG/ML
5 INJECTION, SOLUTION INTRAVENOUS EVERY 6 HOURS PRN
Status: DISCONTINUED | OUTPATIENT
Start: 2024-08-31 | End: 2024-09-03 | Stop reason: HOSPADM

## 2024-08-31 RX ORDER — MAGNESIUM SULFATE HEPTAHYDRATE 40 MG/ML
2 INJECTION, SOLUTION INTRAVENOUS ONCE
Status: COMPLETED | OUTPATIENT
Start: 2024-08-31 | End: 2024-08-31

## 2024-08-31 RX ORDER — LANOLIN ALCOHOL/MO/W.PET/CERES
3 CREAM (GRAM) TOPICAL
Status: DISCONTINUED | OUTPATIENT
Start: 2024-08-31 | End: 2024-09-03 | Stop reason: HOSPADM

## 2024-08-31 RX ORDER — ACETAMINOPHEN 325 MG/1
650 TABLET ORAL EVERY 6 HOURS PRN
Status: DISCONTINUED | OUTPATIENT
Start: 2024-08-31 | End: 2024-09-01

## 2024-08-31 RX ORDER — CEFAZOLIN SODIUM 1 G/50ML
1000 SOLUTION INTRAVENOUS EVERY 8 HOURS
Status: DISCONTINUED | OUTPATIENT
Start: 2024-08-31 | End: 2024-08-31

## 2024-08-31 RX ORDER — ONDANSETRON 2 MG/ML
4 INJECTION INTRAMUSCULAR; INTRAVENOUS EVERY 8 HOURS PRN
Status: DISCONTINUED | OUTPATIENT
Start: 2024-08-31 | End: 2024-09-03 | Stop reason: HOSPADM

## 2024-08-31 RX ORDER — AMOXICILLIN 250 MG
2 CAPSULE ORAL
Status: DISCONTINUED | OUTPATIENT
Start: 2024-08-31 | End: 2024-09-01

## 2024-08-31 RX ORDER — FAMOTIDINE 10 MG/ML
20 INJECTION, SOLUTION INTRAVENOUS EVERY 12 HOURS SCHEDULED
Status: DISCONTINUED | OUTPATIENT
Start: 2024-08-31 | End: 2024-09-02

## 2024-08-31 RX ADMIN — ACETAMINOPHEN 650 MG: 325 TABLET, FILM COATED ORAL at 23:07

## 2024-08-31 RX ADMIN — SODIUM CHLORIDE, SODIUM GLUCONATE, SODIUM ACETATE, POTASSIUM CHLORIDE, MAGNESIUM CHLORIDE, SODIUM PHOSPHATE, DIBASIC, AND POTASSIUM PHOSPHATE 500 ML: .53; .5; .37; .037; .03; .012; .00082 INJECTION, SOLUTION INTRAVENOUS at 02:39

## 2024-08-31 RX ADMIN — VANCOMYCIN HYDROCHLORIDE 1750 MG: 10 INJECTION, POWDER, LYOPHILIZED, FOR SOLUTION INTRAVENOUS at 22:15

## 2024-08-31 RX ADMIN — POTASSIUM CHLORIDE 40 MEQ: 1500 TABLET, EXTENDED RELEASE ORAL at 02:36

## 2024-08-31 RX ADMIN — DIPHENHYDRAMINE HYDROCHLORIDE 25 MG: 50 INJECTION, SOLUTION INTRAMUSCULAR; INTRAVENOUS at 02:28

## 2024-08-31 RX ADMIN — DOXYCYCLINE 100 MG: 100 INJECTION, POWDER, LYOPHILIZED, FOR SOLUTION INTRAVENOUS at 04:00

## 2024-08-31 RX ADMIN — DIPHENHYDRAMINE HYDROCHLORIDE 25 MG: 50 INJECTION, SOLUTION INTRAMUSCULAR; INTRAVENOUS at 08:14

## 2024-08-31 RX ADMIN — DIPHENHYDRAMINE HYDROCHLORIDE 25 MG: 50 INJECTION, SOLUTION INTRAMUSCULAR; INTRAVENOUS at 21:46

## 2024-08-31 RX ADMIN — ACETAMINOPHEN 650 MG: 325 TABLET, FILM COATED ORAL at 15:13

## 2024-08-31 RX ADMIN — VANCOMYCIN HYDROCHLORIDE 1750 MG: 10 INJECTION, POWDER, LYOPHILIZED, FOR SOLUTION INTRAVENOUS at 00:16

## 2024-08-31 RX ADMIN — DOXYCYCLINE 100 MG: 100 CAPSULE ORAL at 15:13

## 2024-08-31 RX ADMIN — FAMOTIDINE 20 MG: 10 INJECTION INTRAVENOUS at 21:46

## 2024-08-31 RX ADMIN — MAGNESIUM SULFATE HEPTAHYDRATE 2 G: 40 INJECTION, SOLUTION INTRAVENOUS at 02:48

## 2024-08-31 RX ADMIN — VANCOMYCIN HYDROCHLORIDE 1750 MG: 10 INJECTION, POWDER, LYOPHILIZED, FOR SOLUTION INTRAVENOUS at 08:13

## 2024-08-31 RX ADMIN — SODIUM CHLORIDE, SODIUM LACTATE, POTASSIUM CHLORIDE, AND CALCIUM CHLORIDE 75 ML/HR: .6; .31; .03; .02 INJECTION, SOLUTION INTRAVENOUS at 01:50

## 2024-08-31 NOTE — ED NOTES
This RN has just completed an IV insertion. Pt then had a syncopal episode after vomiting and HR increased to 160's. Pt then came to and is fully alert and oriented. Provider aware and immediately came to bedside.      Holly Valdez RN  08/31/24 0142       Holly Valdez RN  08/31/24 0144       Holly Valdez RN  08/31/24 0322

## 2024-08-31 NOTE — ED PROVIDER NOTES
"History  Chief Complaint   Patient presents with    Fever     Patient found a red lump on back of right thigh that he through was a pimple. Has progressed in swelling and redness with pain upon ambulation. Now having fevers, HA, vomiting, chills, and flu like symptoms. Urgent care urged to go to ED for further evaluation.     Patient is a 29-year-old male with no significant past medical or surgical history that presents to the emergency department with fevers and chills for 1 day.  Patient has associate some otology of red rash worsening on posterior right thigh beginning with the current ED presentation of fevers and chills.  Patient reports going to urgent care this afternoon, [facility], with paperwork stating negative COVID/flu/RSV, and was treated for cellulitic infection with Augmentin and doxycycline p.o. medications with patient recommended to come to the ED after patient reported oral temperature of 103 °F taken at time of his discharge from the aforementioned facility, [facility].  Patient denies palliative factors with provocative factors of pressure to left leg.  Patient denies noneffective treatment.  Patient denies diarrhea, constipation and urinary symptoms.  Patient does report nausea vomiting symptoms in the ED at this time.  Patient does report that \"I think something bit me, I think I saw bite marks on my leg.\"  Patient reports that he had a \"bump\", behind his right thigh that progressively got worse with spreading redness that caused him enough concern to be evaluated at [facility].  Patient denies cough cold or other URI symptoms.  Patient denies sick contacts and recent travel.  Patient denies chest pain, shortness of breath, and abdominal pain.      History provided by:  Patient   used: No    Fever  Associated symptoms: fever and myalgias    Associated symptoms: no abdominal pain, no chest pain, no congestion, no cough, no diarrhea, no ear pain, no headaches, no " nausea, no rash, no rhinorrhea, no shortness of breath, no sore throat and no vomiting        Prior to Admission Medications   Prescriptions Last Dose Informant Patient Reported? Taking?   azelastine (ASTELIN) 0.1 % nasal spray Not Taking  No No   Si spray into each nostril 2 (two) times a day Use in each nostril as directed   Patient not taking: Reported on 2024   meclizine (ANTIVERT) 25 mg tablet Unknown  No No   Sig: Take 1 tablet (25 mg total) by mouth 3 (three) times a day as needed for dizziness      Facility-Administered Medications: None       Past Medical History:   Diagnosis Date    COVID-19 2021    No pertinent past medical history        Past Surgical History:   Procedure Laterality Date    WISDOM TOOTH EXTRACTION      x 1       Family History   Problem Relation Age of Onset    No Known Problems Mother     No Known Problems Father     No Known Problems Sister     No Known Problems Brother     No Known Problems Son     No Known Problems Son     No Known Problems Maternal Grandmother     No Known Problems Maternal Grandfather     No Known Problems Paternal Grandmother     No Known Problems Paternal Grandfather      I have reviewed and agree with the history as documented.    E-Cigarette/Vaping    E-Cigarette Use Never User      E-Cigarette/Vaping Substances    Nicotine No     THC No     CBD No     Flavoring No     Other No     Unknown No      Social History     Tobacco Use    Smoking status: Never    Smokeless tobacco: Never   Vaping Use    Vaping status: Never Used   Substance Use Topics    Alcohol use: Not Currently    Drug use: No       Review of Systems   Constitutional:  Positive for fever. Negative for activity change, appetite change and chills.   HENT:  Negative for congestion, ear pain, postnasal drip, rhinorrhea, sinus pressure, sinus pain, sore throat and tinnitus.    Eyes:  Negative for photophobia, pain and visual disturbance.   Respiratory:  Negative for cough, chest tightness  "and shortness of breath.    Cardiovascular:  Negative for chest pain and palpitations.   Gastrointestinal:  Negative for abdominal pain, constipation, diarrhea, nausea and vomiting.   Genitourinary:  Negative for difficulty urinating, dysuria, flank pain, frequency, hematuria and urgency.   Musculoskeletal:  Positive for myalgias. Negative for arthralgias, back pain, gait problem, neck pain and neck stiffness.   Skin:  Negative for color change, pallor and rash.   Allergic/Immunologic: Negative for environmental allergies and food allergies.   Neurological:  Negative for dizziness, seizures, syncope, weakness, numbness and headaches.   Psychiatric/Behavioral:  Negative for confusion.    All other systems reviewed and are negative.      Physical Exam  Physical Exam  Vitals and nursing note reviewed.   Constitutional:       General: He is awake. He is not in acute distress.     Appearance: Normal appearance. He is well-developed. He is not ill-appearing, toxic-appearing or diaphoretic.      Comments: /64 (BP Location: Right arm)   Pulse 91   Temp (!) 102.1 °F (38.9 °C) (Oral)   Resp 18   Ht 5' 9\" (1.753 m)   Wt 85.7 kg (189 lb)   SpO2 100%   BMI 27.91 kg/m²      HENT:      Head: Normocephalic and atraumatic.      Right Ear: Hearing and external ear normal. No decreased hearing noted. No drainage, swelling or tenderness. No mastoid tenderness.      Left Ear: Hearing and external ear normal. No decreased hearing noted. No drainage, swelling or tenderness. No mastoid tenderness.      Nose: Nose normal.      Mouth/Throat:      Lips: Pink.      Mouth: Mucous membranes are moist.      Pharynx: Oropharynx is clear. Uvula midline.   Eyes:      General: Lids are normal. Vision grossly intact.         Right eye: No discharge.         Left eye: No discharge.      Extraocular Movements: Extraocular movements intact.      Conjunctiva/sclera: Conjunctivae normal.      Pupils: Pupils are equal, round, and reactive to " light.   Neck:      Vascular: No JVD.      Trachea: Trachea and phonation normal. No tracheal tenderness or tracheal deviation.   Cardiovascular:      Rate and Rhythm: Normal rate and regular rhythm.      Pulses: Normal pulses.           Radial pulses are 2+ on the right side and 2+ on the left side.        Posterior tibial pulses are 2+ on the right side and 2+ on the left side.      Heart sounds: Normal heart sounds. No murmur heard.  Pulmonary:      Effort: Pulmonary effort is normal. No respiratory distress.      Breath sounds: Normal breath sounds. No stridor. No decreased breath sounds, wheezing, rhonchi or rales.   Chest:      Chest wall: No tenderness.   Abdominal:      General: Abdomen is flat. Bowel sounds are normal. There is no distension.      Palpations: Abdomen is soft. Abdomen is not rigid.      Tenderness: There is no abdominal tenderness. There is no guarding or rebound.   Musculoskeletal:         General: No swelling. Normal range of motion.      Cervical back: Full passive range of motion without pain, normal range of motion and neck supple. No rigidity. No spinous process tenderness or muscular tenderness. Normal range of motion.        Legs:    Lymphadenopathy:      Head:      Right side of head: No submental, submandibular, tonsillar, preauricular, posterior auricular or occipital adenopathy.      Left side of head: No submental, submandibular, tonsillar, preauricular, posterior auricular or occipital adenopathy.      Cervical: No cervical adenopathy.      Right cervical: No superficial, deep or posterior cervical adenopathy.     Left cervical: No superficial, deep or posterior cervical adenopathy.   Skin:     General: Skin is warm and dry.      Capillary Refill: Capillary refill takes less than 2 seconds.   Neurological:      General: No focal deficit present.      Mental Status: He is alert and oriented to person, place, and time.      GCS: GCS eye subscore is 4. GCS verbal subscore is 5.  GCS motor subscore is 6.      Sensory: No sensory deficit.   Psychiatric:         Mood and Affect: Mood normal.         Speech: Speech normal.         Behavior: Behavior normal. Behavior is cooperative.         Thought Content: Thought content normal.         Judgment: Judgment normal.               Vital Signs  ED Triage Vitals   Temperature Pulse Respirations Blood Pressure SpO2   08/30/24 2258 08/30/24 2258 08/30/24 2258 08/30/24 2258 08/30/24 2258   (!) 102.1 °F (38.9 °C) 91 18 118/64 100 %      Temp Source Heart Rate Source Patient Position - Orthostatic VS BP Location FiO2 (%)   08/30/24 2258 08/30/24 2258 08/30/24 2258 08/30/24 2258 --   Oral Monitor Sitting Right arm       Pain Score       08/31/24 0326       No Pain           Vitals:    08/31/24 0221 08/31/24 0224 08/31/24 0225 08/31/24 0321   BP: 115/57  105/58 117/67   Pulse: (!) 136 (!) 148 (!) 140 (!) 118   Patient Position - Orthostatic VS:    Lying         Visual Acuity      ED Medications  Medications   lactated ringers infusion (125 mL/hr Intravenous Rate/Dose Change 8/31/24 0330)   acetaminophen (TYLENOL) tablet 650 mg (has no administration in time range)   enoxaparin (LOVENOX) subcutaneous injection 40 mg (has no administration in time range)   doxycycline (VIBRAMYCIN) 100 mg in sodium chloride 0.9 % 100 mL IVPB (100 mg Intravenous New Bag 8/31/24 0400)   ondansetron (ZOFRAN) injection 4 mg (has no administration in time range)   senna-docusate sodium (SENOKOT S) 8.6-50 mg per tablet 2 tablet (has no administration in time range)   melatonin tablet 3 mg (has no administration in time range)   diphenhydrAMINE (BENADRYL) injection 25 mg (25 mg Intravenous Given 8/31/24 0228)   vancomycin (VANCOCIN) 1,750 mg in sodium chloride 0.9 % 500 mL IVPB (has no administration in time range)   metoprolol (LOPRESSOR) injection 5 mg (has no administration in time range)   sodium chloride 0.9 % bolus 1,000 mL (0 mL Intravenous Stopped 8/31/24 0030)   vancomycin  (VANCOCIN) 1,750 mg in sodium chloride 0.9 % 500 mL IVPB (0 mg Intravenous Stopped 8/31/24 0248)   acetaminophen (Ofirmev) injection 1,000 mg (0 mg Intravenous Stopped 8/31/24 0020)   sodium chloride 0.9 % bolus 1,000 mL (0 mL Intravenous Stopped 8/31/24 0025)   ondansetron (ZOFRAN) injection 4 mg (4 mg Intravenous Given 8/30/24 2357)   Famotidine (PF) (PEPCID) injection 20 mg (20 mg Intravenous Given 8/30/24 2357)   potassium chloride (Klor-Con M20) CR tablet 40 mEq (40 mEq Oral Given 8/31/24 0236)   magnesium sulfate 2 g/50 mL IVPB (premix) 2 g (2 g Intravenous New Bag 8/31/24 0248)   multi-electrolyte (ISOLYTE-S PH 7.4) bolus 500 mL (500 mL Intravenous New Bag 8/31/24 0239)       Diagnostic Studies  Results Reviewed       Procedure Component Value Units Date/Time    Basic metabolic panel [[id number]]  (Abnormal) Collected: 08/31/24 0445    Lab Status: Final result Specimen: Blood from Arm, Right Updated: 08/31/24 0541     Sodium 138 mmol/L      Potassium 3.9 mmol/L      Chloride 109 mmol/L      CO2 24 mmol/L      ANION GAP 5 mmol/L      BUN 10 mg/dL      Creatinine 0.82 mg/dL      Glucose 113 mg/dL      Calcium 7.6 mg/dL      eGFR 117 ml/min/1.73sq m     Narrative:      National Kid[employer] Disease Foundation guidelines for Chronic Kidney Disease (CKD):     Stage 1 with normal or high GFR (GFR > 90 mL/min/1.73 square meters)    Stage 2 Mild CKD (GFR = 60-89 mL/min/1.73 square meters)    Stage 3A Moderate CKD (GFR = 45-59 mL/min/1.73 square meters)    Stage 3B Moderate CKD (GFR = 30-44 mL/min/1.73 square meters)    Stage 4 Severe CKD (GFR = 15-29 mL/min/1.73 square meters)    Stage 5 End Stage CKD (GFR <15 mL/min/1.73 square meters)  Note: GFR calculation is accurate only with a steady state creatinine    Magnesium [[id number]]  (Abnormal) Collected: 08/31/24 0445    Lab Status: Final result Specimen: Blood from Arm, Right Updated: 08/31/24 0541     Magnesium 2.8 mg/dL     High Sensitivity Troponin I Random [[id number]]   (Abnormal) Collected: 08/31/24 0445    Lab Status: Final result Specimen: Blood from Arm, Right Updated: 08/31/24 0531     HS TnI random 5 ng/L     CBC and differential [[id number]]  (Abnormal) Collected: 08/31/24 0445    Lab Status: Final result Specimen: Blood from Arm, Right Updated: 08/31/24 0504     WBC 13.68 Thousand/uL      RBC 4.53 Million/uL      Hemoglobin 13.8 g/dL      Hematocrit 39.6 %      MCV 87 fL      MCH 30.5 pg      MCHC 34.8 g/dL      RDW 12.4 %      MPV 9.4 fL      Platelets 181 Thousands/uL      nRBC 0 /100 WBCs      Segmented % 85 %      Immature Grans % 1 %      Lymphocytes % 9 %      Monocytes % 5 %      Eosinophils Relative 0 %      Basophils Relative 0 %      Absolute Neutrophils 11.64 Thousands/µL      Absolute Immature Grans 0.07 Thousand/uL      Absolute Lymphocytes 1.26 Thousands/µL      Absolute Monocytes 0.69 Thousand/µL      Eosinophils Absolute 0.00 Thousand/µL      Basophils Absolute 0.02 Thousands/µL     Procalcitonin [[id number]] Collected: 08/31/24 0445    Lab Status: In process Specimen: Blood from Arm, Right Updated: 08/31/24 0500    B-Type Natriuretic Peptide(BNP) [[id number]]  (Normal) Collected: 08/30/24 2343    Lab Status: Final result Specimen: Blood from Arm, Left Updated: 08/31/24 0254     BNP 71 pg/mL     High Sensitivity Troponin I Random [[id number]]  (Abnormal) Collected: 08/31/24 0218    Lab Status: Final result Specimen: Blood from Arm, Right Updated: 08/31/24 0250     HS TnI random <2 ng/L     Magnesium [[id number]]  (Normal) Collected: 08/30/24 2343    Lab Status: Final result Specimen: Blood from Arm, Left Updated: 08/31/24 0236     Magnesium 1.9 mg/dL     UA w Reflex to Microscopic w Reflex to Culture [[id number]] Collected: 08/31/24 0207    Lab Status: Final result Specimen: Urine, Clean Catch Updated: 08/31/24 0226     Color, UA Light Yellow     Clarity, UA Clear     Specific Gravity, UA 1.013     pH, UA 7.0     Leukocytes, UA Negative     Nitrite, UA Negative      Protein, UA Negative mg/dl      Glucose, UA Negative mg/dl      Ketones, UA Negative mg/dl      Urobilinogen, UA <2.0 mg/dl      Bilirubin, UA Negative     Occult Blood, UA Negative    RBC Morphology Reflex Test [[id number]] Collected: 08/30/24 2343    Lab Status: Final result Specimen: Blood from Arm, Left Updated: 08/31/24 0201    Manual Differential(PHLEBS Do Not Order) [[id number]]  (Abnormal) Collected: 08/30/24 2343    Lab Status: Final result Specimen: Blood from Arm, Left Updated: 08/31/24 0106     Segmented % 74 %      Bands % 5 %      Lymphocytes % 8 %      Monocytes % 10 %      Eosinophils % 3 %      Basophils % 0 %      Absolute Neutrophils 13.90 Thousand/uL      Absolute Lymphocytes 1.41 Thousand/uL      Absolute Monocytes 1.76 Thousand/uL      Absolute Eosinophils 0.53 Thousand/uL      Absolute Basophils 0.00 Thousand/uL      Total Counted --     RBC Morphology Normal     Platelet Estimate Adequate    CBC and differential [[id number]]  (Abnormal) Collected: 08/30/24 2343    Lab Status: Final result Specimen: Blood from Arm, Left Updated: 08/31/24 0106     WBC 17.59 Thousand/uL      RBC 4.88 Million/uL      Hemoglobin 14.8 g/dL      Hematocrit 42.1 %      MCV 86 fL      MCH 30.3 pg      MCHC 35.2 g/dL      RDW 12.1 %      MPV 9.3 fL      Platelets 203 Thousands/uL     Narrative:      This is an appended report.  These results have been appended to a previously verified report.    Procalcitonin [[id number]]  (Abnormal) Collected: 08/30/24 2343    Lab Status: Final result Specimen: Blood from Arm, Left Updated: 08/31/24 0028     Procalcitonin 0.63 ng/ml     Protime-INR [[id number]]  (Abnormal) Collected: 08/30/24 2343    Lab Status: Final result Specimen: Blood from Arm, Left Updated: 08/31/24 0027     Protime 17.0 seconds      INR 1.31    Narrative:      INR Therapeutic Range    Indication                                             INR Range      Atrial Fibrillation                                                2.0-3.0  Hypercoagulable State                                    2.0.2.3  Left Ventricular Asist Device                            2.0-3.0  Mechanical Heart Valve                                  -    Aortic(with afib, MI, embolism, HF, LA enlargement,    and/or coagulopathy)                                     2.0-3.0 (2.5-3.5)     Mitral                                                             2.5-3.5  Prosthetic/Bioprosthetic Heart Valve               2.0-3.0  Venous thromboembolism (VTE: VT, PE        2.0-3.0    APTT [[id number]]  (Normal) Collected: 08/30/24 2343    Lab Status: Final result Specimen: Blood from Arm, Left Updated: 08/31/24 0027     PTT 28 seconds     Comprehensive metabolic panel [[id number]]  (Abnormal) Collected: 08/30/24 2343    Lab Status: Final result Specimen: Blood from Arm, Left Updated: 08/31/24 0018     Sodium 135 mmol/L      Potassium 3.8 mmol/L      Chloride 100 mmol/L      CO2 29 mmol/L      ANION GAP 6 mmol/L      BUN 12 mg/dL      Creatinine 0.91 mg/dL      Glucose 131 mg/dL      Calcium 8.7 mg/dL      AST 12 U/L      ALT 17 U/L      Alkaline Phosphatase 68 U/L      Total Protein 6.3 g/dL      Albumin 4.0 g/dL      Total Bilirubin 0.91 mg/dL      eGFR 111 ml/min/1.73sq m     Narrative:      National Kidney Disease Foundation guidelines for Chronic Kidney Disease (CKD):     Stage 1 with normal or high GFR (GFR > 90 mL/min/1.73 square meters)    Stage 2 Mild CKD (GFR = 60-89 mL/min/1.73 square meters)    Stage 3A Moderate CKD (GFR = 45-59 mL/min/1.73 square meters)    Stage 3B Moderate CKD (GFR = 30-44 mL/min/1.73 square meters)    Stage 4 Severe CKD (GFR = 15-29 mL/min/1.73 square meters)    Stage 5 End Stage CKD (GFR <15 mL/min/1.73 square meters)  Note: GFR calculation is accurate only with a steady state creatinine    C-reactive protein [[id number]]  (Abnormal) Collected: 08/30/24 2343    Lab Status: Final result Specimen: Blood from Arm, Left Updated: 08/31/24 0018     CRP  "88.6 mg/L     Lactic acid, plasma (w/reflex if result > 2.0) [[id number]]  (Normal) Collected: 08/30/24 2343    Lab Status: Final result Specimen: Blood from Arm, Left Updated: 08/31/24 0016     LACTIC ACID 1.0 mmol/L     Narrative:      Result may be elevated if tourniquet was used during collection.    Lyme Total AB W Reflex to IGM/IGG [[id number]] Collected: 08/30/24 2343    Lab Status: In process Specimen: Blood from Arm, Right Updated: 08/30/24 2356    Narrative:      The following orders were created for panel order Lyme Total AB W Reflex to IGM/IGG.  Procedure                               Abnormality         Status                     ---------                               -----------         ------                     Lyme Total AB W Reflex t...[[id number]]                      In process                   Please view results for these tests on the individual orders.    Lyme Total AB W Reflex to IGM/IGG [[id number]] Collected: 08/30/24 2343    Lab Status: In process Specimen: Blood from Arm, Right Updated: 08/30/24 2356    Blood culture #1 [[id number]] Collected: 08/30/24 2343    Lab Status: In process Specimen: Blood from Line, Venous Updated: 08/30/24 2355    Blood culture #2 [[id number]] Collected: 08/30/24 2343    Lab Status: In process Specimen: Blood from Arm, Right Updated: 08/30/24 2355                   XR chest 1 view portable    (Results Pending)   XR femur 2 views RIGHT    (Results Pending)              Procedures  Procedures         ED Course  ED Course as of 08/31/24 0636   Fri Aug 30, 2024   2353 Was called in to pt room with ed technician report of pt \"passing out\".  [facility] ED RN had reported the pt had \"become unresponsive.\" With pt currently aaox3, gcs 15, with no acute neurological deficits.  Pt reports that \"this always happens when I get blood drawn.\"  Vasovagal episode likely with pt subsequently with active vomiting; tachycardia in the 120's with 2 liters of nss currently being delivered. "  Pt deniexs cardiac hx   Sat Aug 31, 2024   0024 LRINEC score with low risk for necrotizing fasciitis, no crepitus proximal to skin lesion.                              Initial Sepsis Screening       Row Name 08/31/24 0029                Is the patient's history suggestive of a new or worsening infection? Yes (Proceed)  -RG        Suspected source of infection wound infection;soft tissue  -RG        Indicate SIRS criteria Hyperthemia > 38.3C (100.9F) OR Hypothermia <36C (96.8F);Tachycardia > 90 bpm;Leukocytosis (WBC > 32238 IJL) OR Leukopenia (WBC <4000 IJL) OR Bandemia (WBC >10% bands)  -RG        Are two or more of the above signs & symptoms of infection both present and new to the patient? Yes (Proceed)  -RG        Assess for evidence of organ dysfunction: Are any of the below criteria present within 6 hours of suspected infection and SIRS criteria that are NOT considered to be chronic conditions? --                  User Key  (r) = Recorded By, (t) = Taken By, (c) = Cosigned By      Initials Name Provider Type    RG Olivier Yoo PA-C Physician Assistant                    SBIRT 20yo+      Flowsheet Row Most Recent Value   Initial Alcohol Screen: US AUDIT-C     1. How often do you have a drink containing alcohol? 0 Filed at: 08/30/2024 2301   2. How many drinks containing alcohol do you have on a typical day you are drinking?  0 Filed at: 08/30/2024 2301   3a. Male UNDER 65: How often do you have five or more drinks on one occasion? 0 Filed at: 08/30/2024 2301   3b. FEMALE Any Age, or MALE 65+: How often do you have 4 or more drinks on one occassion? 0 Filed at: 08/30/2024 2301   Audit-C Score 0 Filed at: 08/30/2024 2301   ERIC: How many times in the past year have you...    Used an illegal drug or used a prescription medication for non-medical reasons? Never Filed at: 08/30/2024 2301                      Medical Decision Making  Patient is a 29-year-old male with no significant past medical or surgical history  that presents to the emergency department with fevers and chills for 1 day.   Patient hemodynamically stable; heart rate initially 95 that escalated to 140s-150s after vasovagal syncope, status post blood labs drawn  SIRS positive, patient febrile, tachycardic, leukocytosis of 17.59, no bandemia, procalcitonin elevated at 0.63, CRP elevated at 88.6, no lactic acidosis  Normal electrolytes, normal kidney function  Blood cultures x 2 in process  2 L normal saline solution; vancomycin; telemetry ordered given patient newfound irregular rhythm noted in ED likely secondary to dehydration, patient has no history of atrial fibrillation or cardiac abnormality.  Right femur negative for osseous abnormality; chest x-ray with no acute cardiopulmonary disease on wet read  Discussion with internal medicine team and both agreed to place patient on inpatient status on the care of Dr. Echevarria, internal medicine  Patient with verbal understanding of all clinical laboratory and imaging findings, admission instructions and verbalized agreement with patient current treatment plan with teach back.    Amount and/or Complexity of Data Reviewed  Labs: ordered. Decision-making details documented in ED Course.  Radiology: ordered and independent interpretation performed. Decision-making details documented in ED Course.    Risk  Prescription drug management.  Decision regarding hospitalization.                 Disposition  Final diagnoses:   Sepsis (HCC)   Cellulitis of right leg     Time reflects when diagnosis was documented in both MDM as applicable and the Disposition within this note       Time User Action Codes Description Comment    8/31/2024  1:07 AM Olivier Yoo Add [A41.9] Sepsis (HCC)     8/31/2024  1:07 AM Olivier Yoo Add [L03.115] Cellulitis of right leg           ED Disposition       ED Disposition   Admit    Condition   Stable    Date/Time   Sat Aug 31, 2024 0112    Comment   Case was discussed with slim and the patient's  admission status was agreed to be Admission Status: inpatient status to the service of Dr. Echevarria, internal medicine .               Follow-up Information    None         Current Discharge Medication List        CONTINUE these medications which have NOT CHANGED    Details   azelastine (ASTELIN) 0.1 % nasal spray 1 spray into each nostril 2 (two) times a day Use in each nostril as directed  Qty: 1 mL, Refills: 1    Associated Diagnoses: Allergic rhinitis, unspecified seasonality, unspecified trigger      meclizine (ANTIVERT) 25 mg tablet Take 1 tablet (25 mg total) by mouth 3 (three) times a day as needed for dizziness  Qty: 30 tablet, Refills: 0    Associated Diagnoses: Dizziness             No discharge procedures on file.    PDMP Review         Value Time User    PDMP Reviewed  Yes 7/8/2020 10:28 AM Chelsea Mayorga DO            ED Provider  Electronically Signed by             Olivier Yoo PA-C  08/31/24 0636

## 2024-08-31 NOTE — ASSESSMENT & PLAN NOTE
New afib following episode of vomiting.  Suspect this is in setting of acute infection.  Rates 120s-160s with activity.  Replete electrolytes PRN, currently receiving IV Mg and PO K.  RR called 8/31 for HR 150s-160s.  BP stable, pt asymptomatic.  Continue transfer to [facility] floor.  Chadsvasc 0  Telemetry. Check trops.

## 2024-08-31 NOTE — ASSESSMENT & PLAN NOTE
Noted episode of syncope shortly after peripheral IV placed and while vomiting.  Similar episodes in the past with blood draws.  Monitoring on telemetry due to above

## 2024-08-31 NOTE — PROGRESS NOTES
Zafar Woodward is a 29 y.o. male who is currently ordered Vancomycin IV with management by the Pharmacy Consult service.  Relevant clinical data and objective / subjective history reviewed.  Vancomycin Assessment:  Indication and Goal AUC/Trough: Soft tissue (goal -600, trough >10)  Clinical Status:  new  Micro:   In process  Renal Function:  SCr: 0.82 mg/dL  CrCl: 113.8 mL/min  Renal replacement: Not on dialysis  Days of Therapy: 1  Current Dose: 1720 mg every 12 hours  Vancomycin Plan:  New Dosing: continue current dose  Estimated AUC: 530 mcg*hr/mL  Estimated Trough: 12.3 mcg/mL  Next Level: 9/1 @ 0600  Renal Function Monitoring: Daily BMP and UOP  Pharmacy will continue to follow closely for s/sx of nephrotoxicity, infusion reactions and appropriateness of therapy.  BMP and CBC will be ordered per protocol. We will continue to follow the patient’s culture results and clinical progress daily.    Wen Salazar, Pharmacist

## 2024-08-31 NOTE — ED NOTES
Pt went to use urinal at bedside and pt had an episode where his HR increased to 160's. Pt fully alert and oriented. Denies CP, SOB. EKG shot and provider made aware.      Holly Valdez RN  08/31/24 0383

## 2024-08-31 NOTE — H&P
Critical access hospital  H&P  Name: Zafar Woodward 29 y.o. male I MRN: [id number]  Unit/Bed#: ED-31 I Date of Admission: 8/30/2024   Date of Service: 8/31/2024 I Hospital Day: 0      Assessment & Plan   * Cellulitis of right lower extremity  Assessment & Plan  Seen at urgent care and started on augmentin, doxycycline.  Notes rapidly increasing erythema and pain.  Xrays pending  Antibiotics: vancomycin, doxycycline  PRN benadryl for itching with vancomycin  Serial exams.  See media tab to monitor progression.    Sepsis (HCC)  Assessment & Plan  Fever, tachycardia, leukocytosis.  Lactate normal.  PCT 0.63.  Source: RLE cellulitis   Antibiotics: vancomycin, doxycycline (Lyme pending)  Blood cultures pending    New onset atrial fibrillation (HCC)  Assessment & Plan  New afib following episode of vomiting.  Suspect this is in setting of acute infection.  Rates 120s-160s with activity.  Replete electrolytes PRN, currently receiving IV Mg and PO K.  RR called 8/31 for HR 150s-160s.  BP stable, pt asymptomatic.  Continue transfer to [facility] floor.  Chadsvasc 0  Telemetry. Check trops.    Vasovagal syncope  Assessment & Plan  Noted episode of syncope shortly after peripheral IV placed and while vomiting.  Similar episodes in the past with blood draws.  Monitoring on telemetry due to above          VTE Pharmacologic Prophylaxis: VTE Score: 3 Moderate Risk (Score 3-4) - Pharmacological DVT Prophylaxis Ordered: enoxaparin (Lovenox).  Code Status: Level 1 - Full Code   Discussion with family: Patient declined call to .     Anticipated Length of Stay: Patient will be admitted on an inpatient basis with an anticipated length of stay of greater than 2 midnights secondary to IV abx.    Total Time Spent on Date of Encounter in care of patient:  mins. This time was spent on one or more of the following: performing physical exam; counseling and coordination of care; obtaining or reviewing history; documenting in  the medical record; reviewing/ordering tests, medications or procedures; communicating with other healthcare professionals and discussing with patient's family/caregivers.    Chief Complaint: leg redness    History of Present Illness:  Zafar Woodward is a 29 y.o. male with a PMH of none who presents with worsening redness to posterior right thigh.  He noted a small bump earlier in the day and was seen at urgent care.  He was prescribed augmentin and doxycycline.  He is unsure if got bitten by a bug.  He is outside with his children on occasion.  Patient meets sepsis.  He reports poor PO intake x 1 day.  He has vomited twice today.      Patient was noted to have syncopal event shortly after having IV placed and while vomiting.  Nurse reports he converted to atrial fibrillation at that time.  Will continue with vancomycin as patient works out at a gym.  Will start doxycycline pending lyme.    Rapid response was called for a fib with HR of 150-160s.  Blood pressure stable and patient asymptomatic.  He can transfer to [facility] floor with telemetry as planned.    Review of Systems:  Review of Systems   Constitutional:  Positive for appetite change, chills and fever.   Respiratory: Negative.     Cardiovascular: Negative.    Gastrointestinal:  Positive for nausea and vomiting. Negative for abdominal distention, abdominal pain, anal bleeding, blood in stool, constipation, diarrhea and rectal pain.   Skin:  Positive for color change and wound.   All other systems reviewed and are negative.      Past Medical and Surgical History:   Past Medical History:   Diagnosis Date    COVID-19 12/26/2021    No pertinent past medical history        Past Surgical History:   Procedure Laterality Date    WISDOM TOOTH EXTRACTION      x 1       Meds/Allergies:  Prior to Admission medications    Medication Sig Start Date End Date Taking? Authorizing Provider   azelastine (ASTELIN) 0.1 % nasal spray 1 spray into each nostril 2 (two) times a day  "Use in each nostril as directed 5/15/24   Juan Jose Petersen DO   meclizine (ANTIVERT) 25 mg tablet Take 1 tablet (25 mg total) by mouth 3 (three) times a day as needed for dizziness 5/15/24   Juan Jose Petersen DO     I have reviewed home medications with patient personally.    Allergies: No Known Allergies    Social History:  Marital Status: Single   Occupation:   Patient Pre-hospital Living Situation: Home  Patient Pre-hospital Level of Mobility: walks  Patient Pre-hospital Diet Restrictions:   Substance Use History:   Social History     Substance and Sexual Activity   Alcohol Use Not Currently     Social History     Tobacco Use   Smoking Status Never   Smokeless Tobacco Never     Social History     Substance and Sexual Activity   Drug Use No       Family History:  Family History   Problem Relation Age of Onset    No Known Problems Mother     No Known Problems Father     No Known Problems Sister     No Known Problems Brother     No Known Problems Son     No Known Problems Son     No Known Problems Maternal Grandmother     No Known Problems Maternal Grandfather     No Known Problems Paternal Grandmother     No Known Problems Paternal Grandfather        Physical Exam:     Vitals:   Blood Pressure: 105/58 (08/31/24 0225)  Pulse: (!) 140 (08/31/24 0225)  Temperature: 98.7 °F (37.1 °C) (08/31/24 0219)  Temp Source: Oral (08/31/24 0219)  Respirations: 20 (08/31/24 0225)  Height: 5' 9\" (175.3 cm) (08/30/24 2258)  Weight - Scale: 85.7 kg (189 lb) (08/30/24 2258)  SpO2: 95 % (08/31/24 0225)    Physical Exam  Constitutional:       Appearance: Normal appearance.   HENT:      Head: Normocephalic and atraumatic.      Right Ear: External ear normal.      Left Ear: External ear normal.      Nose: Nose normal.      Mouth/Throat:      Pharynx: Oropharynx is clear.   Eyes:      Extraocular Movements: Extraocular movements intact.      Conjunctiva/sclera: Conjunctivae normal.   Cardiovascular:      Rate and Rhythm: Tachycardia " "present. Rhythm irregular.      Pulses: Normal pulses.      Heart sounds: Normal heart sounds.   Pulmonary:      Effort: Pulmonary effort is normal.      Breath sounds: Normal breath sounds.   Abdominal:      General: Bowel sounds are normal. There is no distension.      Palpations: Abdomen is soft.      Tenderness: There is no abdominal tenderness. There is no right CVA tenderness, left CVA tenderness or guarding.   Musculoskeletal:         General: Normal range of motion.      Cervical back: Normal range of motion.   Skin:     General: Skin is warm and dry.      Capillary Refill: Capillary refill takes less than 2 seconds.      Comments: Compartments soft, no crepitus  Pain is not out of proportion   Neurological:      General: No focal deficit present.      Mental Status: He is alert and oriented to person, place, and time.   Psychiatric:         Mood and Affect: Mood normal.         Behavior: Behavior normal.          Additional Data:     Lab Results:  Results from last 7 days   Lab Units 08/30/24  2343   WBC Thousand/uL 17.59*   HEMOGLOBIN g/dL 14.8   HEMATOCRIT % 42.1   PLATELETS Thousands/uL 203   BANDS PCT % 5   LYMPHO PCT % 8*   MONO PCT % 10   EOS PCT % 3     Results from last 7 days   Lab Units 08/30/24  2343   SODIUM mmol/L 135   POTASSIUM mmol/L 3.8   CHLORIDE mmol/L 100   CO2 mmol/L 29   BUN mg/dL 12   CREATININE mg/dL 0.91   ANION GAP mmol/L 6   CALCIUM mg/dL 8.7   ALBUMIN g/dL 4.0   TOTAL BILIRUBIN mg/dL 0.91   ALK PHOS U/L 68   ALT U/L 17   AST U/L 12*   GLUCOSE RANDOM mg/dL 131     Results from last 7 days   Lab Units 08/30/24  2343   INR  1.31*         No results found for: \"HGBA1C\"  Results from last 7 days   Lab Units 08/30/24  2343   LACTIC ACID mmol/L 1.0   PROCALCITONIN ng/ml 0.63*       Lines/Drains:  Invasive Devices       Peripheral Intravenous Line  Duration             Peripheral IV 08/30/24 Distal;Left;Upper;Ventral (anterior) Arm <1 day    Peripheral IV 08/30/24 Right Antecubital <1 " day                        Imaging: Reviewed radiology reports from this admission including: chest xray and xray(s)  XR chest 1 view portable    (Results Pending)   XR femur 2 views RIGHT    (Results Pending)       EKG and Other Studies Reviewed on Admission:   EKG: Personally Reviewed. NSR. HR 91.    ** Please Note: This note has been constructed using a voice recognition system. **

## 2024-08-31 NOTE — ASSESSMENT & PLAN NOTE
Fever, tachycardia, leukocytosis.  Lactate normal.  PCT 0.63.  Source: RLE cellulitis   Antibiotics: vancomycin, doxycycline (Lyme pending)  Blood cultures pending

## 2024-08-31 NOTE — RAPID RESPONSE
Rapid Response Note  Zafar Woodward 29 y.o. male MRN: [id number]  Unit/Bed#: S -01 Encounter: [id number]    Rapid Response Notification(s):   Response called date/time:  8/31/2024 2:16 AM  Response team arrival date/time:  8/31/2024 2:20 AM  Response end date/time:  8/31/2024 2:30 AM  [facility]  Rapid response location:  ED  Primary reason for rapid response call:  Acute change in heart rhythm, acute change in heart rate and other (comment) (brief syncopal event during blood draw)    Rapid Response Intervention(s):   Airway:  None  Breathing:  None  Circulation:  None  Fluids administered:  Isolyte/plasmalyte  Medications administered:  Magnesium       Assessment/Plan:   Pt with new onset afib with rates in the 120's - in the setting of cellulitis with fever of 102.1  Give 500cc Isolyte IV bolus, tylenol for fever, and 2g magnesium   Pt states that he gets dizzy whenever he has blood drawn, had brief syncopal event (woke immediately and was AAOx4) - continue cardiac monitoring and close blood pressure monitoring   Demarcate area of cellulitis and continue broad spectrum antibiotic coverage with vancomycin and doxycycline        Rapid Response Outcome:   Transfer:  Remain on floor  Provider notification: N/A pt remained on med surg.    Code Status: Level 1 (Full Code)      Family notified: Primary team to notify Family Member       Background/Situation:   Zafar Woodward is a 29 y.o. male admitted to the [facility] service with cellulitis of R posterior thigh. Pt had IV placed, immediately followed by an episode of vomiting and a brief syncopal event. He woke immediately, remained AAOx4, however was in new onset rapid afib. Rapid response was called.     Review of Systems   Constitutional:  Positive for fever.   HENT: Negative.     Respiratory: Negative.     Cardiovascular: Negative.  Negative for chest pain and palpitations.   Gastrointestinal:  Positive for nausea and vomiting.   Genitourinary:  Negative.    Musculoskeletal: Negative.    Skin:  Positive for color change and wound.   Neurological: Negative.    Psychiatric/Behavioral: Negative.       Objective:   Vitals:    08/31/24 0221 08/31/24 0224 08/31/24 0225 08/31/24 0321   BP: 115/57  105/58 117/67   BP Location:    Left arm   Pulse: (!) 136 (!) 148 (!) 140 (!) 118   Resp:   20 20   Temp:    98.8 °F (37.1 °C)   TempSrc:    Oral   SpO2: 97% 97% 95% 95%   Weight:       Height:         Physical Exam  Vitals and nursing note reviewed.   Constitutional:       General: He is awake. He is not in acute distress.     Appearance: Normal appearance. He is well-developed, well-groomed and normal weight. He is not ill-appearing or toxic-appearing.   HENT:      Head: Normocephalic and atraumatic.      Mouth/Throat:      Mouth: Mucous membranes are moist.      Pharynx: Oropharynx is clear.   Eyes:      Conjunctiva/sclera: Conjunctivae normal.      Pupils: Pupils are equal, round, and reactive to light.   Cardiovascular:      Rate and Rhythm: Tachycardia present. Rhythm irregularly irregular.   Pulmonary:      Effort: Pulmonary effort is normal.      Breath sounds: Normal breath sounds.   Abdominal:      General: There is no distension.      Palpations: Abdomen is soft.      Tenderness: There is no abdominal tenderness.   Musculoskeletal:      Cervical back: Neck supple.      Right lower leg: No edema.      Left lower leg: No edema.   Skin:     General: Skin is warm and dry.   Neurological:      General: No focal deficit present.      Mental Status: He is alert and oriented to person, place, and time.      GCS: GCS eye subscore is 4. GCS verbal subscore is 5. GCS motor subscore is 6.   Psychiatric:         Behavior: Behavior is cooperative.

## 2024-08-31 NOTE — PLAN OF CARE
Problem: PAIN - ADULT  Goal: Verbalizes/displays adequate comfort level or baseline comfort level  Description: Interventions:  - Encourage patient to monitor pain and request assistance  - Assess pain using appropriate pain scale  - Administer analgesics based on type and severity of pain and evaluate response  - Implement non-pharmacological measures as appropriate and evaluate response  - Consider cultural and social influences on pain and pain management  - Notify physician/advanced practitioner if interventions unsuccessful or patient reports new pain  Outcome: Progressing     Problem: INFECTION - ADULT  Goal: Absence or prevention of progression during hospitalization  Description: INTERVENTIONS:  - Assess and monitor for signs and symptoms of infection  - Monitor lab/diagnostic results  - Monitor all insertion sites, i.e. indwelling lines, tubes, and drains  - Monitor endotracheal if appropriate and nasal secretions for changes in amount and color  - [place] appropriate cooling/warming therapies per order  - Administer medications as ordered  - Instruct and encourage patient and family to use good hand hygiene technique  - Identify and instruct in appropriate isolation precautions for identified infection/condition  Outcome: Progressing  Goal: Absence of fever/infection during neutropenic period  Description: INTERVENTIONS:  - Monitor WBC    Outcome: Progressing     Problem: SAFETY ADULT  Goal: Patient will remain free of falls  Description: INTERVENTIONS:  - Educate patient/family on patient safety including physical limitations  - Instruct patient to call for assistance with activity   - Consult OT/PT to assist with strengthening/mobility   - Keep Call bell within reach  - Keep bed low and locked with side rails adjusted as appropriate  - Keep care items and personal belongings within reach  - Initiate and maintain comfort rounds  - Make Fall Risk Sign visible to staff  - Offer Toileting every  Hours,  in advance of need  - Initiate/Maintain alarm  - Obtain necessary fall risk management equipment:   - Apply yellow socks and bracelet for high fall risk patients  - Consider moving patient to room near nurses station  Outcome: Progressing  Goal: Maintain or return to baseline ADL function  Description: INTERVENTIONS:  -  Assess patient's ability to carry out ADLs; assess patient's baseline for ADL function and identify physical deficits which impact ability to perform ADLs (bathing, care of mouth/teeth, toileting, grooming, dressing, etc.)  - Assess/evaluate cause of self-care deficits   - Assess range of motion  - Assess patient's mobility; develop plan if impaired  - Assess patient's need for assistive devices and provide as appropriate  - Encourage maximum independence but intervene and supervise when necessary  - Involve family in performance of ADLs  - Assess for home care needs following discharge   - Consider OT consult to assist with ADL evaluation and planning for discharge  - Provide patient education as appropriate  Outcome: Progressing  Goal: Maintains/Returns to pre admission functional level  Description: INTERVENTIONS:  - Perform AM-PAC 6 Click Basic Mobility/ Daily Activity assessment daily.  - Set and communicate daily mobility goal to care team and patient/family/caregiver.   - Collaborate with rehabilitation services on mobility goals if consulted  - Perform Range of Motion  times a day.  - Reposition patient every  hours.  - Dangle patient  times a day  - Stand patient  times a day  - Ambulate patient  times a day  - Out of bed to chair  times a day   - Out of bed for meals  times a day  - Out of bed for toileting  - Record patient progress and toleration of activity level   Outcome: Progressing     Problem: DISCHARGE PLANNING  Goal: Discharge to home or other facility with appropriate resources  Description: INTERVENTIONS:  - Identify barriers to discharge w/patient and caregiver  - Arrange for  needed discharge resources and transportation as appropriate  - Identify discharge learning needs (meds, wound care, etc.)  - Arrange for interpretive services to assist at discharge as needed  - Refer to Case Management Department for coordinating discharge planning if the patient needs post-hospital services based on physician/advanced practitioner order or complex needs related to functional status, cognitive ability, or social support system  Outcome: Progressing     Problem: Knowledge Deficit  Goal: Patient/family/caregiver demonstrates understanding of disease process, treatment plan, medications, and discharge instructions  Description: Complete learning assessment and assess knowledge base.  Interventions:  - Provide teaching at level of understanding  - Provide teaching via preferred learning methods  Outcome: Progressing

## 2024-08-31 NOTE — ASSESSMENT & PLAN NOTE
Seen at urgent care and started on augmentin, doxycycline.  Notes rapidly increasing erythema and pain.  Xrays pending  Antibiotics: vancomycin, doxycycline  PRN benadryl for itching with vancomycin  Serial exams.  See media tab to monitor progression.

## 2024-08-31 NOTE — CODE DOCUMENTATION
Pt currently c/o of itchiness at this time and currently in rapid Afib. Vanco and LR infusion both paused at this time. Provider aware. Rapid response called at this time.

## 2024-08-31 NOTE — CONSULTS
Consultation - Infectious Disease   Zafar Woodward 29 y.o. male MRN: [id number]  Unit/Bed#: S -01 Encounter: [id number]      IMPRESSION & RECOMMENDATIONS:   Impression/Recommendations:  This is a 29 y.o. male, otherwise healthy, presented to the ER last night with acute development of area of painful and erythematous induration in right medial thigh, with fever and chills.    1.  Sepsis, with fever and leukocytosis.  Source of sepsis is most likely right medial thigh cellulitis.  Patient is clinically improved.  Temperature is down.  WBC decreasing.  Despite sepsis, he has remained systemically well, without toxicity and hemodynamically stable, without hypotension.  Admission blood cultures have no growth thus far.  Antibiotic plan as in below.  Monitor temperature/WBC.  Monitor hemodynamics  Follow-up on admission blood cultures.    2.  Right medial thigh cellulitis.  On physical examination, this appears to be staphylococcal cellulitis rather than Lyme, especially given high fever and high level leukocytosis.  Patient does not have history of MRSA.  No evidence of abscess or involvement of deeper structures clinically.  Continue IV vancomycin.  Check MRSA screen.  Can continue doxycycline for now but changed to p.o.  Follow-up on Lyme screen.  Serial exam.  Monitor temperature/WBC.    3.  New onset atrial fibrillation.  Management per primary service.    4.  Vasovagal syncope, surrounding peripheral IV placement.    Discussed with patient in detail regarding the above plan.  Discussed with Dr. Berry from primary service regard antibiotic plan above.  He is in agreement.    Thank you for this consultation.  We will follow along with you.    HISTORY OF PRESENT ILLNESS:  Reason for Consult: Right thigh cellulitis.    HPI: Zafar Woodward is a 29 y.o. male, otherwise healthy, noticed a painful bump in his right medial thigh early yesterday morning.  Area subsequently became red.  He was seen at urgent care and  was started on Augmentin and doxycycline.  Despite these antibiotics, redness continues..  In addition, patient developed fever/chills.  Therefore, he came to the ER last night for evaluation.  On presentation, patient had fever and leukocytosis.  He had evidence of cellulitis in his right thigh.  He was admitted and started on IV vancomycin.  Doxycycline was given for possible Lyme disease.  For all these reasons, we are asked to evaluate the patient.    At present, patient feels better.  Temperature is down, with no further chills.  Initial bump on right medial thigh is still a little painful.  Patient denies prior history of cellulitis.  He denies history of Lyme disease.  No history of MRSA.  Of note, patient has a syncopal episode right after his IV insertion, felt to be vasovagal response.    REVIEW OF SYSTEMS:  A complete system-based review was done.  Except for what is noted in HPI above, ROS of systems is otherwise negative.    PAST MEDICAL HISTORY:  Past Medical History:   Diagnosis Date    COVID-19 2021    No pertinent past medical history      Past Surgical History:   Procedure Laterality Date    WISDOM TOOTH EXTRACTION      x 1     Problem list reviewed.    FAMILY HISTORY:  Non-contributory    SOCIAL HISTORY:  Social History     Substance and Sexual Activity   Alcohol Use Not Currently     Social History     Substance and Sexual Activity   Drug Use No     Social History     Tobacco Use   Smoking Status Never   Smokeless Tobacco Never       ALLERGIES:  No Known Allergies    MEDICATIONS:  All current active medications have been reviewed.  Patient is currently on IV vancomycin and IV doxycycline.    PHYSICAL EXAM:  Vitals:  Temp:  [98.7 °F (37.1 °C)-102.1 °F (38.9 °C)] 99.7 °F (37.6 °C)  HR:  [] 93  Resp:  [16-20] 20  BP: ()/(54-75) 126/75  SpO2:  [94 %-100 %] 100 %  Temp (24hrs), Av.8 °F (37.7 °C), Min:98.7 °F (37.1 °C), Max:102.1 °F (38.9 °C)  Current: Temperature: 99.7 °F (37.6  °C)     Physical Exam:  General:  Well-nourished, well-developed, in no acute distress. Awake, alert and oriented x 3.  Eyes:  Conjunctive clear with no hemorrhages or effusions  Oropharynx:  No ulcers, no lesions, pharynx benign, no tonsillitis  Neck:  Supple, no lymphadenopathy, no mass, nontender  Lungs:  Expansion symmetric, no rales, no wheezing, no accessory muscle use  Cardiac:  Regular rate and rhythm, normal S1, normal S2, no murmurs  Abdomen:  Soft, nondistended, non-tender, no HSM  Extremities: Right medial thigh with area of induration and what looks like folliculitis.  There is surrounding erythema/warmth and mild tenderness.  No fluctuance.  Skin:  No rashes, no ulcers  Neurological:  Moves all four extremities spontaneously, sensation grossly intact    LABS, IMAGING, & OTHER STUDIES:  Lab Results:  I have personally reviewed pertinent labs.  Results from last 7 days   Lab Units 08/31/24  0445 08/30/24  2343   POTASSIUM mmol/L 3.9 3.8   CHLORIDE mmol/L 109* 100   CO2 mmol/L 24 29   BUN mg/dL 10 12   CREATININE mg/dL 0.82 0.91   EGFR ml/min/1.73sq m 117 111   CALCIUM mg/dL 7.6* 8.7   AST U/L  --  12*   ALT U/L  --  17   ALK PHOS U/L  --  68     Results from last 7 days   Lab Units 08/31/24  0445 08/30/24  2343   WBC Thousand/uL 13.68* 17.59*   HEMOGLOBIN g/dL 13.8 14.8   PLATELETS Thousands/uL 181 203     Results from last 7 days   Lab Units 08/30/24  2343   BLOOD CULTURE  Received in Microbiology Lab. Culture in Progress.  Received in Microbiology Lab. Culture in Progress.       Imaging Studies:   I have personally reviewed pertinent imaging study reports and images in PACS.    EKG, Pathology, and Other Studies:   I have personally reviewed pertinent reports.

## 2024-08-31 NOTE — SEPSIS NOTE
Sepsis Note   Zafar Woodward 29 y.o. male MRN: [id number]  Unit/Bed#: ED-31 Encounter: [id number]       Initial Sepsis Screening       Row Name 08/31/24 0029                Is the patient's history suggestive of a new or worsening infection? Yes (Proceed)  -RG        Suspected source of infection wound infection;soft tissue  -RG        Indicate SIRS criteria Hyperthemia > 38.3C (100.9F) OR Hypothermia <36C (96.8F);Tachycardia > 90 bpm;Leukocytosis (WBC > 86280 IJL) OR Leukopenia (WBC <4000 IJL) OR Bandemia (WBC >10% bands)  -RG        Are two or more of the above signs & symptoms of infection both present and new to the patient? Yes (Proceed)  -RG        Assess for evidence of organ dysfunction: Are any of the below criteria present within 6 hours of suspected infection and SIRS criteria that are NOT considered to be chronic conditions? --                  User Key  (r) = Recorded By, (t) = Taken By, (c) = Cosigned By      Initials Name Provider Type    RG Olivier Yoo PA-C Physician Assistant                        Body mass index is 27.91 kg/m².  Wt Readings from Last 1 Encounters:   08/30/24 85.7 kg (189 lb)     IBW (Ideal Body Weight): 70.7 kg    Ideal body weight: 70.7 kg (155 lb 13.8 oz)  Adjusted ideal body weight: 76.7 kg (169 lb 1.9 oz)

## 2024-09-01 ENCOUNTER — APPOINTMENT (INPATIENT)
Dept: CT IMAGING | Facility: HOSPITAL | Age: 29
DRG: 872 | End: 2024-09-01
Payer: COMMERCIAL

## 2024-09-01 PROBLEM — E83.42 HYPOMAGNESEMIA: Status: ACTIVE | Noted: 2024-09-01

## 2024-09-01 LAB
ANION GAP SERPL CALCULATED.3IONS-SCNC: 5 MMOL/L (ref 4–13)
BUN SERPL-MCNC: 6 MG/DL (ref 5–25)
CALCIUM SERPL-MCNC: 8.1 MG/DL (ref 8.4–10.2)
CHLORIDE SERPL-SCNC: 108 MMOL/L (ref 96–108)
CO2 SERPL-SCNC: 25 MMOL/L (ref 21–32)
CREAT SERPL-MCNC: 0.57 MG/DL (ref 0.6–1.3)
ERYTHROCYTE [DISTWIDTH] IN BLOOD BY AUTOMATED COUNT: 12.6 % (ref 11.6–15.1)
GFR SERPL CREATININE-BSD FRML MDRD: 136 ML/MIN/1.73SQ M
GLUCOSE SERPL-MCNC: 135 MG/DL (ref 65–140)
HCT VFR BLD AUTO: 36.7 % (ref 36.5–49.3)
HGB BLD-MCNC: 12.6 G/DL (ref 12–17)
MAGNESIUM SERPL-MCNC: 1.8 MG/DL (ref 1.9–2.7)
MCH RBC QN AUTO: 30.2 PG (ref 26.8–34.3)
MCHC RBC AUTO-ENTMCNC: 34.3 G/DL (ref 31.4–37.4)
MCV RBC AUTO: 88 FL (ref 82–98)
MRSA NOSE QL CULT: NORMAL
PLATELET # BLD AUTO: 169 THOUSANDS/UL (ref 149–390)
PMV BLD AUTO: 9.3 FL (ref 8.9–12.7)
POTASSIUM SERPL-SCNC: 3.6 MMOL/L (ref 3.5–5.3)
PROCALCITONIN SERPL-MCNC: 0.39 NG/ML
RBC # BLD AUTO: 4.17 MILLION/UL (ref 3.88–5.62)
SODIUM SERPL-SCNC: 138 MMOL/L (ref 135–147)
VANCOMYCIN TROUGH SERPL-MCNC: 37 UG/ML (ref 10–20)
WBC # BLD AUTO: 15.88 THOUSAND/UL (ref 4.31–10.16)

## 2024-09-01 PROCEDURE — 80048 BASIC METABOLIC PNL TOTAL CA: CPT | Performed by: NURSE PRACTITIONER

## 2024-09-01 PROCEDURE — 83735 ASSAY OF MAGNESIUM: CPT

## 2024-09-01 PROCEDURE — 99232 SBSQ HOSP IP/OBS MODERATE 35: CPT

## 2024-09-01 PROCEDURE — 85027 COMPLETE CBC AUTOMATED: CPT

## 2024-09-01 PROCEDURE — 73701 CT LOWER EXTREMITY W/DYE: CPT

## 2024-09-01 PROCEDURE — 84145 PROCALCITONIN (PCT): CPT

## 2024-09-01 PROCEDURE — 80202 ASSAY OF VANCOMYCIN: CPT | Performed by: NURSE PRACTITIONER

## 2024-09-01 PROCEDURE — 99233 SBSQ HOSP IP/OBS HIGH 50: CPT | Performed by: INTERNAL MEDICINE

## 2024-09-01 RX ORDER — POLYETHYLENE GLYCOL 3350 17 G/17G
17 POWDER, FOR SOLUTION ORAL DAILY PRN
Status: DISCONTINUED | OUTPATIENT
Start: 2024-09-01 | End: 2024-09-03 | Stop reason: HOSPADM

## 2024-09-01 RX ORDER — DOCUSATE SODIUM 100 MG/1
100 CAPSULE, LIQUID FILLED ORAL 2 TIMES DAILY
Status: DISCONTINUED | OUTPATIENT
Start: 2024-09-01 | End: 2024-09-03 | Stop reason: HOSPADM

## 2024-09-01 RX ORDER — OXYCODONE HYDROCHLORIDE 5 MG/1
5 TABLET ORAL EVERY 4 HOURS PRN
Status: DISCONTINUED | OUTPATIENT
Start: 2024-09-01 | End: 2024-09-03 | Stop reason: HOSPADM

## 2024-09-01 RX ORDER — ACETAMINOPHEN 325 MG/1
975 TABLET ORAL EVERY 8 HOURS PRN
Status: DISCONTINUED | OUTPATIENT
Start: 2024-09-01 | End: 2024-09-02

## 2024-09-01 RX ADMIN — DOXYCYCLINE 100 MG: 100 CAPSULE ORAL at 22:18

## 2024-09-01 RX ADMIN — VANCOMYCIN HYDROCHLORIDE 1500 MG: 5 INJECTION, POWDER, LYOPHILIZED, FOR SOLUTION INTRAVENOUS at 23:24

## 2024-09-01 RX ADMIN — FAMOTIDINE 20 MG: 10 INJECTION INTRAVENOUS at 08:38

## 2024-09-01 RX ADMIN — DIPHENHYDRAMINE HYDROCHLORIDE 25 MG: 50 INJECTION, SOLUTION INTRAMUSCULAR; INTRAVENOUS at 08:38

## 2024-09-01 RX ADMIN — SODIUM CHLORIDE, SODIUM LACTATE, POTASSIUM CHLORIDE, AND CALCIUM CHLORIDE 125 ML/HR: .6; .31; .03; .02 INJECTION, SOLUTION INTRAVENOUS at 00:39

## 2024-09-01 RX ADMIN — FAMOTIDINE 20 MG: 10 INJECTION INTRAVENOUS at 22:18

## 2024-09-01 RX ADMIN — DOXYCYCLINE 100 MG: 100 CAPSULE ORAL at 08:38

## 2024-09-01 RX ADMIN — ACETAMINOPHEN 650 MG: 325 TABLET, FILM COATED ORAL at 08:38

## 2024-09-01 RX ADMIN — ACETAMINOPHEN 975 MG: 325 TABLET, FILM COATED ORAL at 15:59

## 2024-09-01 RX ADMIN — VANCOMYCIN HYDROCHLORIDE 1750 MG: 10 INJECTION, POWDER, LYOPHILIZED, FOR SOLUTION INTRAVENOUS at 08:39

## 2024-09-01 RX ADMIN — IOHEXOL 85 ML: 350 INJECTION, SOLUTION INTRAVENOUS at 16:59

## 2024-09-01 RX ADMIN — DIPHENHYDRAMINE HYDROCHLORIDE 25 MG: 50 INJECTION, SOLUTION INTRAMUSCULAR; INTRAVENOUS at 22:18

## 2024-09-01 NOTE — ASSESSMENT & PLAN NOTE
Fever, tachycardia, leukocytosis.  Lactate normal.  PCT 0.63.  Source: RLE cellulitis   Antibiotics: vancomycin, doxycycline (Lyme negative)  Blood cultures negative at 24 hours  Infectious disease consulted, input appreciated  Continue IV vancomycin and PO doxycycline  Follow-up MRSA swab  If fever and leukocytosis do not improve within the next 24 hours, recommend checking CT of right thigh  9/1 redness appears to be expanding on thigh, RN asked to reoutline

## 2024-09-01 NOTE — ASSESSMENT & PLAN NOTE
New afib following episode of vomiting.  Suspect this is in setting of acute infection.  Rates 120s-160s with activity.  Replete electrolytes PRN, currently receiving IV Mg and PO K.  RR called 8/31 for HR 150s-160s.  BP stable, pt asymptomatic.  Continue transfer to MS floor.  Chadsvasc 0  Telemetry.  Troponins unremarkable  Now back in normal sinus rhythm, will monitor another 24 hours on telemetry

## 2024-09-01 NOTE — ASSESSMENT & PLAN NOTE
Seen at urgent care and started on augmentin, doxycycline.  Notes rapidly increasing erythema and pain.  Xrays pending  Antibiotics: vancomycin, doxycycline  PRN benadryl for itching with vancomycin  Serial exams.  See media tab to monitor progression.  Antibiotics as discussed under sepsis  Pain regimen ordered and as needed ice packs

## 2024-09-01 NOTE — PROGRESS NOTES
[facility]  Progress Note  Name: Zafar Woodward I  MRN: [id number]  Unit/Bed#: S -01 I Date of Admission: 8/30/2024   Date of Service: 9/1/2024 I Hospital Day: 1    Assessment & Plan   Sepsis (HCC)  Assessment & Plan  Fever, tachycardia, leukocytosis.  Lactate normal.  PCT 0.63.  Source: RLE cellulitis   Antibiotics: vancomycin, doxycycline (Lyme negative)  Blood cultures negative at 24 hours  Infectious disease consulted, input appreciated  Continue IV vancomycin and PO doxycycline  Follow-up MRSA swab  If fever and leukocytosis do not improve within the next 24 hours, recommend checking CT of right thigh  9/1 redness appears to be expanding on thigh, RN asked to reoutline    * Cellulitis of right lower extremity  Assessment & Plan  Seen at urgent care and started on augmentin, doxycycline.  Notes rapidly increasing erythema and pain.  Xrays pending  Antibiotics: vancomycin, doxycycline  PRN benadryl for itching with vancomycin  Serial exams.  See media tab to monitor progression.  Antibiotics as discussed under sepsis  Pain regimen ordered and as needed ice packs    Hypomagnesemia  Assessment & Plan  Magnesium of 1.8 this morning  In light of elevated magnesium yesterday at 2.8, will hold off on repletion and recheck in a.m.    Lab Results   Component Value Date/Time    MG 1.8 (L) 09/01/2024 10:03 AM    MG 2.8 (H) 08/31/2024 04:45 AM    MG 1.9 08/30/2024 11:43 PM        New onset atrial fibrillation (HCC)  Assessment & Plan  New afib following episode of vomiting.  Suspect this is in setting of acute infection.  Rates 120s-160s with activity.  Replete electrolytes PRN, currently receiving IV Mg and PO K.  RR called 8/31 for HR 150s-160s.  BP stable, pt asymptomatic.  Continue transfer to MS floor.  Chadsvasc 0  Telemetry.  Troponins unremarkable  Now back in normal sinus rhythm, will monitor another 24 hours on telemetry    Vasovagal syncope  Assessment & Plan  Noted episode of  syncope shortly after peripheral IV placed and while vomiting.  Similar episodes in the past with blood draws.  Monitoring on telemetry due to above              VTE Pharmacologic Prophylaxis: VTE Score: 3 Moderate Risk (Score 3-4) - Pharmacological DVT Prophylaxis Ordered: enoxaparin (Lovenox).    Mobility:   Basic Mobility Inpatient Raw Score: 23  JH-HLM Goal: 7: Walk 25 feet or more  JH-HLM Achieved: 8: Walk 250 feet ot more  JH-HLM Goal achieved. Continue to encourage appropriate mobility.    Patient Centered Rounds: I evaluated the patient without nursing staff present due to RN unavailable, communicated via epic chat    Discussions with Specialists or Other Care Team Provider: Infectious disease    Education and Discussions with Family / Patient: Updated  (wife, mother, and brother in law) at bedside.    Total Time Spent on Date of Encounter in care of patient:  mins. This time was spent on one or more of the following: performing physical exam; counseling and coordination of care; obtaining or reviewing history; documenting in the medical record; reviewing/ordering tests, medications or procedures; communicating with other healthcare professionals and discussing with patient's family/caregivers.    Current Length of Stay: 1 day(s)  Current Patient Status: Inpatient   Certification Statement: The patient will continue to require additional inpatient hospital stay due to IV antibiotics and cellulitis monitoring  Discharge Plan: Anticipate discharge in 48-72 hrs to home.    Code Status: Level 1 - Full Code    Subjective:   Patient is resting in bed.  He reports he has been eating and drinking well but has not had a bowel movement since prior to admission.  He states he normally goes daily.  He is having pain in his leg when he puts weight on it and ambulates limiting his activity levels.  Discussed what you think blood work and antibiotic plan with patient and family.  All questions and concerns  answered.    Objective:     Vitals:   Temp (24hrs), Av.5 °F (38.1 °C), Min:98.6 °F (37 °C), Max:102.9 °F (39.4 °C)    Temp:  [98.6 °F (37 °C)-102.9 °F (39.4 °C)] 99.8 °F (37.7 °C)  HR:  [] 88  BP: (136-148)/(72-77) 136/72  SpO2:  [94 %-98 %] 97 %  Body mass index is 27.91 kg/m².     Input and Output Summary (last 24 hours):     Intake/Output Summary (Last 24 hours) at 2024 1214  Last data filed at 2024 0800  Gross per 24 hour   Intake 1160 ml   Output 5550 ml   Net -4390 ml       Physical Exam:   Physical Exam  Vitals and nursing note reviewed.   Constitutional:       Appearance: Normal appearance.   HENT:      Head: Normocephalic.      Mouth/Throat:      Mouth: Mucous membranes are moist.      Pharynx: Oropharynx is clear.   Cardiovascular:      Rate and Rhythm: Normal rate and regular rhythm.      Pulses: Normal pulses.      Heart sounds: Normal heart sounds. No murmur heard.  Pulmonary:      Effort: Pulmonary effort is normal. No respiratory distress.      Breath sounds: Normal breath sounds. No wheezing or rhonchi.   Abdominal:      General: Bowel sounds are normal. There is no distension.      Palpations: Abdomen is soft.      Tenderness: There is no abdominal tenderness. There is no guarding.   Musculoskeletal:      Right lower leg: No edema.      Left lower leg: No edema.   Skin:     General: Skin is warm and dry.   Neurological:      General: No focal deficit present.      Mental Status: He is alert. Mental status is at baseline.   Psychiatric:         Mood and Affect: Mood normal.         Thought Content: Thought content normal.                Additional Data:     Labs:  Results from last 7 days   Lab Units 24  1003 24  0445 24  2343   WBC Thousand/uL 15.88* 13.68* 17.59*   HEMOGLOBIN g/dL 12.6 13.8 14.8   HEMATOCRIT % 36.7 39.6 42.1   PLATELETS Thousands/uL 169 181 203   BANDS PCT %  --   --  5   SEGS PCT %  --  85*  --    LYMPHO PCT %  --  9* 8*   MONO PCT %  --  5 10    EOS PCT %  --  0 3     Results from last 7 days   Lab Units 09/01/24  1003 08/31/24  0445 08/30/24  2343   SODIUM mmol/L 138   < > 135   POTASSIUM mmol/L 3.6   < > 3.8   CHLORIDE mmol/L 108   < > 100   CO2 mmol/L 25   < > 29   BUN mg/dL 6   < > 12   CREATININE mg/dL 0.57*   < > 0.91   ANION GAP mmol/L 5   < > 6   CALCIUM mg/dL 8.1*   < > 8.7   ALBUMIN g/dL  --   --  4.0   TOTAL BILIRUBIN mg/dL  --   --  0.91   ALK PHOS U/L  --   --  68   ALT U/L  --   --  17   AST U/L  --   --  12*   GLUCOSE RANDOM mg/dL 135   < > 131    < > = values in this interval not displayed.     Results from last 7 days   Lab Units 08/30/24  2343   INR  1.31*             Results from last 7 days   Lab Units 09/01/24  1003 08/31/24  0445 08/30/24  2343   LACTIC ACID mmol/L  --   --  1.0   PROCALCITONIN ng/ml 0.39* 0.61* 0.63*       Lines/Drains:  Invasive Devices       Peripheral Intravenous Line  Duration             Peripheral IV 08/30/24 Distal;Left;Upper;Ventral (anterior) Arm 1 day    Peripheral IV 08/30/24 Right Antecubital 1 day                      Telemetry:  Telemetry Orders (From admission, onward)               24 Hour Telemetry Monitoring  Continuous x 24 Hours (Telem)        Question:  Reason for 24 Hour Telemetry  Answer:  Metabolic/electrolyte disturbance with high probability of dysrhythmia. K level <3 or >6 OR KCL infusion >10mEq/hr                     Telemetry Reviewed: Normal Sinus Rhythm  Indication for Continued Telemetry Use: Arrthymias requiring medical therapy             Imaging: Personally reviewed the following imaging: chest xray and xray(s)    Recent Cultures (last 7 days):   Results from last 7 days   Lab Units 08/30/24  2343   BLOOD CULTURE  No Growth at 24 hrs.  No Growth at 24 hrs.       Last 24 Hours Medication List:   Current Facility-Administered Medications   Medication Dose Route Frequency Provider Last Rate    acetaminophen  975 mg Oral Q8H PRN CISCO Chicas      diphenhydrAMINE  25 mg  Intravenous Q6H PRN CISCO Mcmlulen      docusate sodium  100 mg Oral BID CISCO Chicas      doxycycline hyclate  100 mg Oral Q12H [facility] Derek Gunter MD      enoxaparin  40 mg Subcutaneous Daily CISCO Mcmullen      famotidine  20 mg Intravenous Q12H [facility] CISCO Mcmullen      lactated ringers  125 mL/hr Intravenous Continuous CISCO Mcmullen 125 mL/hr (09/01/24 0039)    melatonin  3 mg Oral HS PRN CISCO Mcmullen      metoprolol  5 mg Intravenous Q6H PRN CISCO Mcmullen      ondansetron  4 mg Intravenous Q8H PRN CISCO Mcmullen      oxyCODONE  2.5 mg Oral Q4H PRN CISCO Chicas      Or    oxyCODONE  5 mg Oral Q4H PRN CISCO Chicas      polyethylene glycol  17 g Oral Daily PRN CISCO Chicas      vancomycin  1,750 mg Intravenous Q12H CISCO Mcmullen 1,750 mg (09/01/24 0839)        Today, Patient Was Seen By: CISCO Chicas    **Please Note: This note may have been constructed using a voice recognition system.**

## 2024-09-01 NOTE — PROGRESS NOTES
Progress Note - Infectious Disease   Zafar Woodward 29 y.o. male MRN: [id number]  Unit/Bed#: S -01 Encounter: [id number]      Impression/Recommendations:  1.  Sepsis, with fever, tachycardia and leukocytosis.  Source of sepsis is most likely right medial thigh cellulitis.  Patient is clinically improved, although temperature and WBC remain up.  Tachycardia has resolved.  Despite sepsis, he has remained systemically well, without toxicity and hemodynamically stable, without hypotension.  Admission blood cultures have no growth thus far.  Antibiotic plan as in below.  Monitor temperature/WBC.  Monitor hemodynamics  Follow-up on admission blood cultures.     2.  Right medial thigh cellulitis.  On physical examination, this appears to be staphylococcal cellulitis rather than Lyme, especially given high fever and high level leukocytosis.  Patient does not have history of MRSA.  No evidence of abscess or involvement of deeper structures clinically.  Cellulitis is stable on examination today.  However, patient has persistent fever and leukocytosis.  If his fever and leukocytosis do not resolve in the next 24 hours, we will need to image leg.  Lyme screen is negative but if patient does indeed have acute Lyme disease, Lyme serology would be negative.  For now, with lack of improvement, we will stay with current antibiotic regimen without de-escalation.  Continue IV vancomycin.  Follow-up MRSA screen.  Continue doxycycline for now.  Serial exam.  Monitor temperature/WBC.  If fever and leukocytosis do not improve the next 24 hours, recommend checking CT of right thigh.     3.  New onset atrial fibrillation.  Management per primary service.     4.  Vasovagal syncope, surrounding peripheral IV placement.     Discussed with patient in detail regarding the above plan.  Discussed with CISCO Sosa from primary service regard antibiotic plan above.  She is in agreement.    Antibiotics:  Vancomycin/doxycycline #  2    Subjective:  Patient with stable mild pain in right medial thigh.  Temperature remains up overnight.  He is tolerating antibiotics well.  No nausea, vomiting or diarrhea.    Objective:  Vitals:  Temp:  [98.6 °F (37 °C)-102.9 °F (39.4 °C)] 99.8 °F (37.7 °C)  HR:  [] 88  BP: (136-148)/(72-77) 136/72  SpO2:  [94 %-98 %] 97 %  Temp (24hrs), Av.5 °F (38.1 °C), Min:98.6 °F (37 °C), Max:102.9 °F (39.4 °C)  Current: Temperature: 99.8 °F (37.7 °C)    Physical Exam:     General: Awake, alert, cooperative, no distress.   Neck:  Supple. No mass.  No lymphadenopathy.   Lungs: Expansion symmetric, no rales, no wheezing, respirations unlabored.   Heart:  Regular rate and rhythm, S1 and S2 normal, no murmur.   Abdomen: Soft, nondistended, non-tender, bowel sounds active all four quadrants, no masses, no organomegaly.   Extremities: No edema.  Right medial thigh with stable induration and erythema/warmth.  Stable mild tenderness.  No fluctuance.   Skin:  No rash.   Neuro: Moves all extremities.     Invasive Devices       Peripheral Intravenous Line  Duration             Peripheral IV 24 Distal;Left;Upper;Ventral (anterior) Arm 1 day    Peripheral IV 24 Right Antecubital 1 day                    Labs studies:   I have personally reviewed pertinent labs.  Results from last 7 days   Lab Units 24  1003 24  0445 24  2343   POTASSIUM mmol/L 3.6 3.9 3.8   CHLORIDE mmol/L 108 109* 100   CO2 mmol/L 25 24 29   BUN mg/dL 6 10 12   CREATININE mg/dL 0.57* 0.82 0.91   EGFR ml/min/1.73sq m 136 117 111   CALCIUM mg/dL 8.1* 7.6* 8.7   AST U/L  --   --  12*   ALT U/L  --   --  17   ALK PHOS U/L  --   --  68     Results from last 7 days   Lab Units 24  1003 24  0445 24  2343   WBC Thousand/uL 15.88* 13.68* 17.59*   HEMOGLOBIN g/dL 12.6 13.8 14.8   PLATELETS Thousands/uL 169 181 203     Results from last 7 days   Lab Units 24  2343   BLOOD CULTURE  No Growth at 24 hrs.  No Growth at  24 hrs.       Imaging Studies:   I have personally reviewed pertinent imaging study reports and images in PACS.    EKG, Pathology, and Other Studies:   I have personally reviewed pertinent reports.

## 2024-09-01 NOTE — PROGRESS NOTES
Zafar Woodward is a 29 y.o. male who is currently ordered Vancomycin IV with management by the Pharmacy Consult service.  Relevant clinical data and objective / subjective history reviewed.  Vancomycin Assessment:  Indication and Goal AUC/Trough: Soft tissue (goal -600, trough >10)  Clinical Status: stable  Micro:     Renal Function:  SCr: 0.57 mg/dL  CrCl: 203 mL/min  Renal replacement: Not on dialysis  Days of Therapy: 2  Current Dose: 1750 mg IV every 12 hours  Vancomycin Plan:  New Dosin mg IV every 12 hours  Estimated AUC: 532 mcg*hr/mL  Estimated Trough: 13.1 mcg/mL  Next Level:  @ 0600  Renal Function Monitoring: Daily BMP and UOP  Pharmacy will continue to follow closely for s/sx of nephrotoxicity, infusion reactions and appropriateness of therapy.  BMP and CBC will be ordered per protocol. We will continue to follow the patient’s culture results and clinical progress daily.    Joan Flynn, Pharmacist

## 2024-09-01 NOTE — PLAN OF CARE
Problem: PAIN - ADULT  Goal: Verbalizes/displays adequate comfort level or baseline comfort level  Description: Interventions:  - Encourage patient to monitor pain and request assistance  - Assess pain using appropriate pain scale  - Administer analgesics based on type and severity of pain and evaluate response  - Implement non-pharmacological measures as appropriate and evaluate response  - Consider cultural and social influences on pain and pain management  - Notify physician/advanced practitioner if interventions unsuccessful or patient reports new pain  Outcome: Progressing     Problem: INFECTION - ADULT  Goal: Absence or prevention of progression during hospitalization  Description: INTERVENTIONS:  - Assess and monitor for signs and symptoms of infection  - Monitor lab/diagnostic results  - Monitor all insertion sites, i.e. indwelling lines, tubes, and drains  - Monitor endotracheal if appropriate and nasal secretions for changes in amount and color  - [place] appropriate cooling/warming therapies per order  - Administer medications as ordered  - Instruct and encourage patient and family to use good hand hygiene technique  - Identify and instruct in appropriate isolation precautions for identified infection/condition  Outcome: Progressing  Goal: Absence of fever/infection during neutropenic period  Description: INTERVENTIONS:  - Monitor WBC    Outcome: Progressing     Problem: SAFETY ADULT  Goal: Patient will remain free of falls  Description: INTERVENTIONS:  - Educate patient/family on patient safety including physical limitations  - Instruct patient to call for assistance with activity   - Consult OT/PT to assist with strengthening/mobility   - Keep Call bell within reach  - Keep bed low and locked with side rails adjusted as appropriate  - Keep care items and personal belongings within reach  - Initiate and maintain comfort rounds  - Make Fall Risk Sign visible to staff  - Offer Toileting every  Hours,  in advance of need  - Initiate/Maintain alarm  - Obtain necessary fall risk management equipment:   - Apply yellow socks and bracelet for high fall risk patients  - Consider moving patient to room near nurses station  Outcome: Progressing  Goal: Maintain or return to baseline ADL function  Description: INTERVENTIONS:  -  Assess patient's ability to carry out ADLs; assess patient's baseline for ADL function and identify physical deficits which impact ability to perform ADLs (bathing, care of mouth/teeth, toileting, grooming, dressing, etc.)  - Assess/evaluate cause of self-care deficits   - Assess range of motion  - Assess patient's mobility; develop plan if impaired  - Assess patient's need for assistive devices and provide as appropriate  - Encourage maximum independence but intervene and supervise when necessary  - Involve family in performance of ADLs  - Assess for home care needs following discharge   - Consider OT consult to assist with ADL evaluation and planning for discharge  - Provide patient education as appropriate  Outcome: Progressing  Goal: Maintains/Returns to pre admission functional level  Description: INTERVENTIONS:  - Perform AM-PAC 6 Click Basic Mobility/ Daily Activity assessment daily.  - Set and communicate daily mobility goal to care team and patient/family/caregiver.   - Collaborate with rehabilitation services on mobility goals if consulted  - Perform Range of Motion  times a day.  - Reposition patient every  hours.  - Dangle patient  times a day  - Stand patient  times a day  - Ambulate patient  times a day  - Out of bed to chair  times a day   - Out of bed for meals times a day  - Out of bed for toileting  - Record patient progress and toleration of activity level   Outcome: Progressing     Problem: DISCHARGE PLANNING  Goal: Discharge to home or other facility with appropriate resources  Description: INTERVENTIONS:  - Identify barriers to discharge w/patient and caregiver  - Arrange for  needed discharge resources and transportation as appropriate  - Identify discharge learning needs (meds, wound care, etc.)  - Arrange for interpretive services to assist at discharge as needed  - Refer to Case Management Department for coordinating discharge planning if the patient needs post-hospital services based on physician/advanced practitioner order or complex needs related to functional status, cognitive ability, or social support system  Outcome: Progressing     Problem: Knowledge Deficit  Goal: Patient/family/caregiver demonstrates understanding of disease process, treatment plan, medications, and discharge instructions  Description: Complete learning assessment and assess knowledge base.  Interventions:  - Provide teaching at level of understanding  - Provide teaching via preferred learning methods  Outcome: Progressing

## 2024-09-01 NOTE — UTILIZATION REVIEW
Initial Clinical Review    Admission: Date/Time/Statement:   Admission Orders (From admission, onward)       Ordered        08/31/24 0112  INPATIENT ADMISSION  Once                          Orders Placed This Encounter   Procedures    INPATIENT ADMISSION     Standing Status:   Standing     Number of Occurrences:   1     Order Specific Question:   Level of Care     Answer:   Med Surg [16]     Order Specific Question:   Estimated length of stay     Answer:   More than 2 Midnights     Order Specific Question:   Certification     Answer:   I certify that inpatient services are medically necessary for this patient for a duration of greater than two midnights. See H&P and MD Progress Notes for additional information about the patient's course of treatment.     ED Arrival Information       Expected   -    Arrival   8/30/2024 22:47    Acuity   Urgent              Means of arrival   Walk-In    Escorted by   Self    Service   Hospitalist    Admission type   Emergency              Arrival complaint   Fever/Sore body and throat/Insect Bite             Chief Complaint   Patient presents with    Fever     Patient found a red lump on back of right thigh that he through was a pimple. Has progressed in swelling and redness with pain upon ambulation. Now having fevers, HA, vomiting, chills, and flu like symptoms. Urgent care urged to go to ED for further evaluation.       Initial Presentation: 29 y.o. male w/o PMH who presents to ED with rapidly increasing erythema and pain to posterior right thigh with fever, chills. .  He noted a small bump earlier in the day and was seen at urgent care.  He was prescribed augmentin and doxycycline.  He is unsure if got bitten by a bug. Poor po intake x 1 day. Pt had  syncopal event shortly after having IV placed and while vomiting. Rapid response called. Nurse reports he converted to atrial fibrillation at that time with rate 150-160's. On exam, R medial/posterior thigh with redness, approx 5x5 cm  ,with centralized papular lesion w/ scab noted .Warm to touch .  Area marked, see photos. Pt febrile 102.1 F. Labs : WBC 17.59, procal 0.63, CRP elevated . Mag 1.9 XR R femur shows nothing acute  Pt given IVF,  IV abx, IV Tylenol, IV antiemetic . Lyte repletion in ED. Pt admitted as Inpatient to telemetry with cellulitis RLE, sepsis, new A fib. Plan- telemetry. IV abx- Vanco, Doxycycline . Serial exams RLE . PRN Benadryl for itching from vanco . F/U blood cx . F/U Lyme titer. ID consult.   Anticipated Length of Stay/Certification Statement: Patient will be admitted on an inpatient basis with an anticipated length of stay of greater than 2 midnights secondary to IV abx.    Rapid response :new onset afib with rates in the 120's - in the setting of cellulitis with fever of 102.1 Pt states that he gets dizzy whenever he has blood drawn, had brief syncopal event (woke immediately and was AAOx4) - continue cardiac monitoring and close blood pressure monitoring  . Give 500cc Isolyte IV bolus, tylenol for fever, and 2g magnesium     ID consult - sepsis. R medial thigh cellulitis . IV abx- vanco . Can continue doxycycline for now but changed to p.o. Follow-up on Lyme screen. F/U blood cx . Monitor temp, WBC's . Serial exams R thigh.       Date: 9/1    Day 2:    Temperature and WBC remain up.  Tachycardia has resolved.Blood cultures negative at 24 hours .  Redness appears to be expanding on thigh, area re outlined    Right medial thigh with stable induration per ID . + erythema/warmth. Stable mild tenderness. No fluctuance.Having pain in his leg when he puts weight on it , when ambulates limiting his activity levels.  If his fever and leukocytosis do not resolve in the next 24 hours, ID recommends CT R thigh  leg. Lyme screen is negative but if patient does indeed have acute Lyme disease, Lyme serology would be negative. For now, with lack of improvement, we will stay with current antibiotic regimen without de-escalation. F/U  MRSA swab . Now back in normal sinus rhythm, will monitor another 24 hours on telemetry           8/31 RLE      9/1 RLE       9/1 RLE         ED Triage Vitals   Temperature Pulse Respirations Blood Pressure SpO2 Pain Score   08/30/24 2258 08/30/24 2258 08/30/24 2258 08/30/24 2258 08/30/24 2258 08/31/24 0326   (!) 102.1 °F (38.9 °C) 91 18 118/64 100 % No Pain     Weight (last 2 days)       Date/Time Weight    08/30/24 2258 85.7 (189)            Vital Signs (last 3 days)       Date/Time Temp Pulse Resp BP MAP (mmHg) SpO2 O2 Device Patient Position - Orthostatic VS Pain    09/01/24 1559 -- -- -- -- -- -- -- -- Med Not Given for Pain - for MAR use only    09/01/24 15:24:40 101.8 °F (38.8 °C) 94 -- 141/76 98 100 % -- -- --    09/01/24 0838 -- -- -- -- -- -- -- -- 6    09/01/24 07:35:53 99.8 °F (37.7 °C) 88 -- 136/72 93 97 % -- -- --    09/01/24 0700 -- -- -- -- -- -- -- -- No Pain    09/01/24 0015 100.4 °F (38 °C) 92 -- -- -- -- -- -- --    08/31/24 2340 101.2 °F (38.4 °C) 94 -- -- -- -- -- -- --    08/31/24 23:00:50 102.9 °F (39.4 °C) 99 -- -- -- 98 % -- -- --    08/31/24 2000 -- -- -- -- -- 97 % None (Room air) -- No Pain    08/31/24 17:47:36 98.6 °F (37 °C) 87 -- -- -- 94 % -- -- --    08/31/24 1513 -- -- -- -- -- -- -- -- Med Not Given for Pain - for MAR use only    08/31/24 15:09:52 99.9 °F (37.7 °C) 100 -- 148/77 101 97 % -- -- --    08/31/24 0800 -- -- -- -- -- -- -- -- No Pain    08/31/24 07:52:45 99.7 °F (37.6 °C) 93 -- 126/75 92 100 % -- -- --    08/31/24 0400 -- -- -- -- -- 97 % None (Room air) -- No Pain    08/31/24 0326 -- -- -- -- -- -- -- -- No Pain    08/31/24 0321 98.8 °F (37.1 °C) 118 20 117/67 84 95 % None (Room air) Lying --    08/31/24 0225 -- 140 20 105/58 -- 95 % None (Room air) -- --    08/31/24 0224 -- 148 -- -- -- 97 % -- -- --    08/31/24 0221 -- 136 -- 115/57 -- 97 % -- -- --    08/31/24 02:20:08 -- -- -- -- -- -- None (Room air) -- --    08/31/24 02:19:42 -- -- -- -- -- 96 % -- -- --     08/31/24 02:19:11 98.7 °F (37.1 °C) 140 16 124/55 -- 97 % None (Room air) Lying --    08/31/24 0218 -- 136 -- 124/55 -- 97 % -- -- --    08/31/24 0215 -- 95 18 92/65 75 96 % None (Room air) Lying --    08/31/24 0200 -- 131 18 98/62 72 96 % None (Room air) Lying --    08/31/24 0154 -- 130 20 107/68 81 97 % None (Room air) Lying --    08/31/24 0145 -- 125 16 95/60 71 96 % None (Room air) Lying --    08/31/24 0130 -- 123 18 119/57 77 96 % None (Room air) Sitting --    08/31/24 0115 -- 124 18 109/60 76 96 % None (Room air) Lying --    08/31/24 0100 -- 128 18 94/55 69 96 % None (Room air) Lying --    08/31/24 0045 -- 124 20 112/54 78 96 % None (Room air) Lying --    08/31/24 0030 -- 121 20 116/56 80 96 % None (Room air) Lying --    08/31/24 0015 -- 125 18 114/58 77 96 % None (Room air) Lying --    08/31/24 0000 -- 123 20 116/57 82 94 % None (Room air) Lying --    08/30/24 2258 102.1 °F (38.9 °C) 91 18 118/64 85 100 % None (Room air) Sitting --              Pertinent Labs/Diagnostic Test Results:   Radiology:  XR chest 1 view portable   Final Interpretation by Ramana Landa MD (09/01 1308)      No acute cardiopulmonary disease.            Workstation performed: [id number]         XR femur 2 views RIGHT   Final Interpretation by Ramana Landa MD (09/01 1308)      No acute osseous abnormality.         Computerized Assisted Algorithm (CAA) may have been used to analyze all applicable images.         Workstation performed: [id number]         CT lower extremity w contrast right    (Results Pending)     Cardiology: 8/30 ECG- NSR, HR 91 .         Results from last 7 days   Lab Units 09/01/24  1003 08/31/24  0445 08/30/24  2343   WBC Thousand/uL 15.88* 13.68* 17.59*   HEMOGLOBIN g/dL 12.6 13.8 14.8   HEMATOCRIT % 36.7 39.6 42.1   PLATELETS Thousands/uL 169 181 203   TOTAL NEUT ABS Thousands/µL  --  11.64*  --    BANDS PCT %  --   --  5         Results from last 7 days   Lab Units 09/01/24  1003 08/31/24  0445  08/30/24  2343   SODIUM mmol/L 138 138 135   POTASSIUM mmol/L 3.6 3.9 3.8   CHLORIDE mmol/L 108 109* 100   CO2 mmol/L 25 24 29   ANION GAP mmol/L 5 5 6   BUN mg/dL 6 10 12   CREATININE mg/dL 0.57* 0.82 0.91   EGFR ml/min/1.73sq m 136 117 111   CALCIUM mg/dL 8.1* 7.6* 8.7   MAGNESIUM mg/dL 1.8* 2.8* 1.9     Results from last 7 days   Lab Units 08/30/24  2343   AST U/L 12*   ALT U/L 17   ALK PHOS U/L 68   TOTAL PROTEIN g/dL 6.3*   ALBUMIN g/dL 4.0   TOTAL BILIRUBIN mg/dL 0.91         Results from last 7 days   Lab Units 09/01/24  1003 08/31/24  0445 08/30/24  2343   GLUCOSE RANDOM mg/dL 135 113 131               Results from last 7 days   Lab Units 08/30/24  2343   PROTIME seconds 17.0*   INR  1.31*   PTT seconds 28         Results from last 7 days   Lab Units 09/01/24  1003 08/31/24  0445 08/30/24  2343   PROCALCITONIN ng/ml 0.39* 0.61* 0.63*     Results from last 7 days   Lab Units 08/30/24  2343   LACTIC ACID mmol/L 1.0             Results from last 7 days   Lab Units 08/30/24  2343   BNP pg/mL 71           Results from last 7 days   Lab Units 08/30/24  2343   CRP mg/L 88.6*             Results from last 7 days   Lab Units 08/31/24  0207   CLARITY UA  Clear   COLOR UA  Light Yellow   SPEC GRAV UA  1.013   PH UA  7.0   GLUCOSE UA mg/dl Negative   KETONES UA mg/dl Negative   BLOOD UA  Negative   PROTEIN UA mg/dl Negative   NITRITE UA  Negative   BILIRUBIN UA  Negative   UROBILINOGEN UA (BE) mg/dl <2.0   LEUKOCYTES UA  Negative                   Results from last 7 days   Lab Units 08/30/24  2343   BLOOD CULTURE  No Growth at 24 hrs.  No Growth at 24 hrs.             Results from last 7 days   Lab Units 09/01/24  1003   VANCOMYCIN TR ug/mL 37.0*       ED Treatment-Medication Administration from 08/30/2024 6967 to 08/31/2024 0319         Date/Time Order Dose Route Action     08/30/2024 2358 sodium chloride 0.9 % bolus 1,000 mL 1,000 mL Intravenous New Bag     08/31/2024 0016 vancomycin (VANCOCIN) 1,750 mg in sodium  chloride 0.9 % 500 mL IVPB 1,750 mg Intravenous New Bag     08/31/2024 0215 vancomycin (VANCOCIN) 1,750 mg in sodium chloride 0.9 % 500 mL IVPB -- Intravenous Restarted     08/30/2024 2358 acetaminophen (Ofirmev) injection 1,000 mg 1,000 mg Intravenous New Bag     08/30/2024 2349 sodium chloride 0.9 % bolus 1,000 mL 1,000 mL Intravenous New Bag     08/30/2024 2357 ondansetron (ZOFRAN) injection 4 mg 4 mg Intravenous Given     08/30/2024 2357 Famotidine (PF) (PEPCID) injection 20 mg 20 mg Intravenous Given     08/31/2024 0150 lactated ringers infusion 75 mL/hr Intravenous New Bag     08/31/2024 0236 potassium chloride (Klor-Con M20) CR tablet 40 mEq 40 mEq Oral Given     08/31/2024 0248 magnesium sulfate 2 g/50 mL IVPB (premix) 2 g 2 g Intravenous New Bag     08/31/2024 0228 diphenhydrAMINE (BENADRYL) injection 25 mg 25 mg Intravenous Given     08/31/2024 0239 multi-electrolyte (ISOLYTE-S PH 7.4) bolus 500 mL 500 mL Intravenous New Bag            Past Medical History:   Diagnosis Date    COVID-19 12/26/2021    No pertinent past medical history      Present on Admission:  **None**      Admitting Diagnosis: Fever [R50.9]  Cellulitis of right leg [L03.115]  Sepsis (HCC) [A41.9]  Age/Sex: 29 y.o. male  Admission Orders:  Scheduled Medications:  docusate sodium, 100 mg, Oral, BID  doxycycline hyclate, 100 mg, Oral, Q12H DEYANIRA  enoxaparin, 40 mg, Subcutaneous, Daily  famotidine, 20 mg, Intravenous, Q12H DEYANIRA  vancomycin, 1,500 mg, Intravenous, Q12H    vancomycin (VANCOCIN) 1,750 mg in sodium chloride 0.9 % 500 mL IVPB  Dose: 1,750 mg  Freq: Every 12 hours Route: IV  Last Dose: 1,750 mg (09/01/24 0839)  Start: 08/31/24 0800 End: 09/01/24 1237  doxycycline (VIBRAMYCIN) 100 mg in sodium chloride 0.9 % 100 mL IVPB  Dose: 100 mg  Freq: Every 12 hours Route: IV  Indications Comment: lyme pending  Last Dose: 100 mg (08/31/24 0400)  Start: 08/31/24 0130 End: 08/31/24 1123      Continuous IV Infusions:  lactated ringers, 125 mL/hr,  Intravenous, Continuous      PRN Meds:  acetaminophen, 975 mg, Oral, Q8H PRN x1 9/1   diphenhydrAMINE, 25 mg, Intravenous, Q6H PRN x 3 8/31, x1 9/1   melatonin, 3 mg, Oral, HS PRN  metoprolol, 5 mg, Intravenous, Q6H PRN  ondansetron, 4 mg, Intravenous, Q8H PRN  oxyCODONE, 2.5 mg, Oral, Q4H PRN   Or  oxyCODONE, 5 mg, Oral, Q4H PRN  polyethylene glycol, 17 g, Oral, Daily PRN  Dose: 650 mg  Freq: Every 6 hours PRN Route: PO  PRN Reason: mild pain  Indications of Use: FEVER,HEADACHE,MILD PAIN  Start: 08/31/24 0113 End: 09/01/24 1204 x1 8/31       Reg diet   OOB as humaira   Telemetry      IP CONSULT TO PHARMACY  IP CONSULT TO INFECTIOUS DISEASES    Network Utilization Review Department  ATTENTION: Please call with any questions or concerns to [phone] and carefully listen to the prompts so that you are directed to the right person. All voicemails are confidential.   For Discharge needs, contact Care Management DC Support Team at [phone] opt. 2  Send all requests for admission clinical reviews, approved or denied determinations and any other requests to dedicated fax number below belonging to the campus where the patient is receiving treatment. List of dedicated fax numbers for the Facilities:  FACILITY NAME UR FAX NUMBER   ADMISSION DENIALS (Administrative/Medical Necessity) [phone]   DISCHARGE SUPPORT TEAM (NETWORK) [phone]   PARENT CHILD HEALTH (Maternity/NICU/Pediatrics) [phone]   [facility] [phone]   [facility] [phone]   [facility] [phone]   [facility] [phone]   [facility] [phone]   [facility] [phone]   [facility] [phone]   [facility] [phone]   [facility] [phone]   [facility]  [place] [phone]   [facility] [phone]   [facility] [phone]

## 2024-09-01 NOTE — ASSESSMENT & PLAN NOTE
Magnesium of 1.8 this morning  In light of elevated magnesium yesterday at 2.8, will hold off on repletion and recheck in a.m.    Lab Results   Component Value Date/Time    MG 1.8 (L) 09/01/2024 10:03 AM    MG 2.8 (H) 08/31/2024 04:45 AM    MG 1.9 08/30/2024 11:43 PM

## 2024-09-02 PROBLEM — R55 VASOVAGAL SYNCOPE: Status: RESOLVED | Noted: 2024-08-31 | Resolved: 2024-09-02

## 2024-09-02 LAB
ANION GAP SERPL CALCULATED.3IONS-SCNC: 7 MMOL/L (ref 4–13)
BUN SERPL-MCNC: 6 MG/DL (ref 5–25)
CALCIUM SERPL-MCNC: 8.2 MG/DL (ref 8.4–10.2)
CHLORIDE SERPL-SCNC: 107 MMOL/L (ref 96–108)
CO2 SERPL-SCNC: 24 MMOL/L (ref 21–32)
CREAT SERPL-MCNC: 0.59 MG/DL (ref 0.6–1.3)
ERYTHROCYTE [DISTWIDTH] IN BLOOD BY AUTOMATED COUNT: 12.7 % (ref 11.6–15.1)
GFR SERPL CREATININE-BSD FRML MDRD: 134 ML/MIN/1.73SQ M
GLUCOSE SERPL-MCNC: 98 MG/DL (ref 65–140)
HCT VFR BLD AUTO: 37.1 % (ref 36.5–49.3)
HGB BLD-MCNC: 12.9 G/DL (ref 12–17)
MAGNESIUM SERPL-MCNC: 1.8 MG/DL (ref 1.9–2.7)
MCH RBC QN AUTO: 30.6 PG (ref 26.8–34.3)
MCHC RBC AUTO-ENTMCNC: 34.8 G/DL (ref 31.4–37.4)
MCV RBC AUTO: 88 FL (ref 82–98)
PLATELET # BLD AUTO: 179 THOUSANDS/UL (ref 149–390)
PMV BLD AUTO: 9.7 FL (ref 8.9–12.7)
POTASSIUM SERPL-SCNC: 3.7 MMOL/L (ref 3.5–5.3)
RBC # BLD AUTO: 4.22 MILLION/UL (ref 3.88–5.62)
SODIUM SERPL-SCNC: 138 MMOL/L (ref 135–147)
WBC # BLD AUTO: 11.56 THOUSAND/UL (ref 4.31–10.16)

## 2024-09-02 PROCEDURE — 85027 COMPLETE CBC AUTOMATED: CPT

## 2024-09-02 PROCEDURE — 80048 BASIC METABOLIC PNL TOTAL CA: CPT

## 2024-09-02 PROCEDURE — 99232 SBSQ HOSP IP/OBS MODERATE 35: CPT | Performed by: PHYSICIAN ASSISTANT

## 2024-09-02 PROCEDURE — 99233 SBSQ HOSP IP/OBS HIGH 50: CPT | Performed by: INTERNAL MEDICINE

## 2024-09-02 PROCEDURE — 83735 ASSAY OF MAGNESIUM: CPT

## 2024-09-02 RX ORDER — ACETAMINOPHEN 325 MG/1
975 TABLET ORAL EVERY 6 HOURS PRN
Status: DISCONTINUED | OUTPATIENT
Start: 2024-09-02 | End: 2024-09-03 | Stop reason: HOSPADM

## 2024-09-02 RX ORDER — CEFAZOLIN SODIUM 2 G/50ML
2000 SOLUTION INTRAVENOUS EVERY 8 HOURS
Status: DISCONTINUED | OUTPATIENT
Start: 2024-09-02 | End: 2024-09-03 | Stop reason: HOSPADM

## 2024-09-02 RX ORDER — MAGNESIUM SULFATE HEPTAHYDRATE 40 MG/ML
2 INJECTION, SOLUTION INTRAVENOUS ONCE
Status: COMPLETED | OUTPATIENT
Start: 2024-09-02 | End: 2024-09-03

## 2024-09-02 RX ADMIN — FAMOTIDINE 20 MG: 10 INJECTION INTRAVENOUS at 08:46

## 2024-09-02 RX ADMIN — CEFAZOLIN SODIUM 2000 MG: 2 SOLUTION INTRAVENOUS at 20:26

## 2024-09-02 RX ADMIN — ACETAMINOPHEN 975 MG: 325 TABLET, FILM COATED ORAL at 20:58

## 2024-09-02 RX ADMIN — MAGNESIUM SULFATE HEPTAHYDRATE 2 G: 40 INJECTION, SOLUTION INTRAVENOUS at 08:50

## 2024-09-02 RX ADMIN — CEFAZOLIN SODIUM 2000 MG: 2 SOLUTION INTRAVENOUS at 13:15

## 2024-09-02 RX ADMIN — DOCUSATE SODIUM 100 MG: 100 CAPSULE, LIQUID FILLED ORAL at 08:46

## 2024-09-02 RX ADMIN — ACETAMINOPHEN 975 MG: 325 TABLET, FILM COATED ORAL at 15:03

## 2024-09-02 RX ADMIN — VANCOMYCIN HYDROCHLORIDE 1500 MG: 5 INJECTION, POWDER, LYOPHILIZED, FOR SOLUTION INTRAVENOUS at 08:45

## 2024-09-02 RX ADMIN — DOXYCYCLINE 100 MG: 100 CAPSULE ORAL at 08:46

## 2024-09-02 NOTE — PROGRESS NOTES
Formerly [facility], [facility]  Progress Note  Name: Zafar Woodward I  MRN: [id number]  Unit/Bed#: S -01 I Date of Admission: 8/30/2024   Date of Service: 9/2/2024 I Hospital Day: 2    Assessment & Plan   * Cellulitis of right lower extremity  Assessment & Plan  Seen at urgent care and started on augmentin, doxycycline.  Noted rapidly increasing erythema and pain.  CT RLE c/w cellulitis, no abscess  Antibiotics: vancomycin, doxycycline--to be changed to IV Ancef today per ID for strong suspicion of streptococcal infection  Blistering noted to be slightly worsening (see image in media)  Pain control measures    New onset atrial fibrillation (HCC)  Assessment & Plan  New afib following episode of vomiting.  Suspect this is in setting of acute infection.  RR called 8/31 for HR 150s-160s.  BP stable, pt asymptomatic.   Chadsvasc 0  Telemetry has been normal sinus rhythm for 48 hours.  Troponins unremarkable  Cardiology referral on discharge    Sepsis (HCC)  Assessment & Plan  Fever, tachycardia, leukocytosis.  Lactate normal.  Fever was persistent and now finally improving  Source: RLE cellulitis   Antibiotics: vancomycin, doxycycline. Plan for change to IV ancef today per ID  Blood cultures negative      Hypomagnesemia  Assessment & Plan  Magnesium of 1.8 --replete again  Patient reporting headache, Mag should be of benefit    Lab Results   Component Value Date/Time    MG 1.8 (L) 09/02/2024 05:36 AM    MG 1.8 (L) 09/01/2024 10:03 AM    MG 2.8 (H) 08/31/2024 04:45 AM        Vasovagal syncope-resolved as of 9/2/2024  Assessment & Plan  Noted episode of syncope shortly after peripheral IV placed and while vomiting.  Similar episodes in the past with blood draws.  IVF provided             VTE Pharmacologic Prophylaxis: VTE Score: 3 lovenox    Mobility:   Basic Mobility Inpatient Raw Score: 23  JH-HLM Goal: 7: Walk 25 feet or more  JH-HLM Achieved: 7: Walk 25 feet or more  JH-HLM Goal achieved. Continue to  encourage appropriate mobility.    Patient Centered Rounds: I performed bedside rounds with nursing staff today.   Discussions with Specialists or Other Care Team Provider: ID    Education and Discussions with Family / Patient: Updated  (wife) at bedside.    Total Time Spent on Date of Encounter in care of patient: 30 mins. This time was spent on one or more of the following: performing physical exam; counseling and coordination of care; obtaining or reviewing history; documenting in the medical record; reviewing/ordering tests, medications or procedures; communicating with other healthcare professionals and discussing with patient's family/caregivers.    Current Length of Stay: 2 day(s)  Current Patient Status: Inpatient   Certification Statement: The patient will continue to require additional inpatient hospital stay due to IV antbx  Discharge Plan: Anticipate discharge in 24-48 hrs to home.    Code Status: Level 1 - Full Code    Subjective:   Patient noting some headache.  Still having some pain in his leg but less severe. Blistering noted to be a little worse. Temp came down.    Objective:     Vitals:   Temp (24hrs), Av.9 °F (37.7 °C), Min:98.2 °F (36.8 °C), Max:101.8 °F (38.8 °C)    Temp:  [98.2 °F (36.8 °C)-101.8 °F (38.8 °C)] 98.2 °F (36.8 °C)  HR:  [85-94] 89  BP: (121-141)/(73-81) 137/81  SpO2:  [94 %-100 %] 94 %  Body mass index is 27.91 kg/m².     Input and Output Summary (last 24 hours):     Intake/Output Summary (Last 24 hours) at 2024 1220  Last data filed at 2024 0900  Gross per 24 hour   Intake 1300 ml   Output 1950 ml   Net -650 ml       Physical Exam:   Physical Exam  Vitals reviewed.   Constitutional:       General: He is not in acute distress.     Appearance: Normal appearance. He is not ill-appearing, toxic-appearing or diaphoretic.   Eyes:      General: No scleral icterus.        Right eye: No discharge.         Left eye: No discharge.   Cardiovascular:      Rate and  Rhythm: Normal rate and regular rhythm.      Heart sounds: No murmur heard.  Pulmonary:      Effort: No respiratory distress.      Breath sounds: No stridor. No wheezing, rhonchi or rales.   Abdominal:      General: There is no distension.      Tenderness: There is no abdominal tenderness. There is no guarding.   Musculoskeletal:         General: Tenderness present. No deformity or signs of injury.      Right lower leg: No edema.      Left lower leg: No edema.   Skin:     Coloration: Skin is not jaundiced or pale.      Findings: Erythema and lesion present. No bruising or rash.      Comments: Right thigh   Neurological:      General: No focal deficit present.      Mental Status: He is alert. Mental status is at baseline.   Psychiatric:         Mood and Affect: Mood normal.         Thought Content: Thought content normal.          Additional Data:     Labs:  Results from last 7 days   Lab Units 09/02/24  0536 09/01/24  1003 08/31/24  0445 08/30/24  2343   WBC Thousand/uL 11.56*   < > 13.68* 17.59*   HEMOGLOBIN g/dL 12.9   < > 13.8 14.8   HEMATOCRIT % 37.1   < > 39.6 42.1   PLATELETS Thousands/uL 179   < > 181 203   BANDS PCT %  --   --   --  5   SEGS PCT %  --   --  85*  --    LYMPHO PCT %  --   --  9* 8*   MONO PCT %  --   --  5 10   EOS PCT %  --   --  0 3    < > = values in this interval not displayed.     Results from last 7 days   Lab Units 09/02/24  0536 08/31/24  0445 08/30/24  2343   SODIUM mmol/L 138   < > 135   POTASSIUM mmol/L 3.7   < > 3.8   CHLORIDE mmol/L 107   < > 100   CO2 mmol/L 24   < > 29   BUN mg/dL 6   < > 12   CREATININE mg/dL 0.59*   < > 0.91   ANION GAP mmol/L 7   < > 6   CALCIUM mg/dL 8.2*   < > 8.7   ALBUMIN g/dL  --   --  4.0   TOTAL BILIRUBIN mg/dL  --   --  0.91   ALK PHOS U/L  --   --  68   ALT U/L  --   --  17   AST U/L  --   --  12*   GLUCOSE RANDOM mg/dL 98   < > 131    < > = values in this interval not displayed.     Results from last 7 days   Lab Units 08/30/24  5524   INR  1.31*              Results from last 7 days   Lab Units 09/01/24  1003 08/31/24  0445 08/30/24  2343   LACTIC ACID mmol/L  --   --  1.0   PROCALCITONIN ng/ml 0.39* 0.61* 0.63*       Lines/Drains:  Invasive Devices       Peripheral Intravenous Line  Duration             Peripheral IV 08/30/24 Distal;Left;Upper;Ventral (anterior) Arm 2 days    Peripheral IV 08/30/24 Right Antecubital 2 days                      Imaging: CT results reviewed with patient    Recent Cultures (last 7 days):   Results from last 7 days   Lab Units 08/30/24  2343   BLOOD CULTURE  No Growth at 48 hrs.  No Growth at 48 hrs.       Last 24 Hours Medication List:   Current Facility-Administered Medications   Medication Dose Route Frequency Provider Last Rate    acetaminophen  975 mg Oral Q8H PRN CISCO Chicas      diphenhydrAMINE  25 mg Intravenous Q6H PRN CISCO Mcmullen      docusate sodium  100 mg Oral BID CISCO Chicas      doxycycline hyclate  100 mg Oral Q12H DEYANIRA Gunter MD      enoxaparin  40 mg Subcutaneous Daily CISCO Mcmullen      melatonin  3 mg Oral HS PRN Tatiana Serra, CISCO      metoprolol  5 mg Intravenous Q6H PRN CISCO Mcmullen      ondansetron  4 mg Intravenous Q8H PRN CISCO Mcmullen      oxyCODONE  2.5 mg Oral Q4H PRN CISCO Chicas      Or    oxyCODONE  5 mg Oral Q4H PRN CISCO Chicas      polyethylene glycol  17 g Oral Daily PRN CISCO Chicas      vancomycin  1,500 mg Intravenous Q12H Reilly Hein MD          Today, Patient Was Seen By: Lauren Urias PA-C    **Please Note: This note may have been constructed using a voice recognition system.**

## 2024-09-02 NOTE — ASSESSMENT & PLAN NOTE
Magnesium of 1.8 --replete again  Patient reporting headache, Mag should be of benefit    Lab Results   Component Value Date/Time    MG 1.8 (L) 09/02/2024 05:36 AM    MG 1.8 (L) 09/01/2024 10:03 AM    MG 2.8 (H) 08/31/2024 04:45 AM

## 2024-09-02 NOTE — ASSESSMENT & PLAN NOTE
Fever, tachycardia, leukocytosis.  Lactate normal.  Fever was persistent and now finally improving  Source: RLE cellulitis   Antibiotics: vancomycin, doxycycline. Plan for change to IV ancef today per ID  Blood cultures negative

## 2024-09-02 NOTE — ASSESSMENT & PLAN NOTE
New afib following episode of vomiting.  Suspect this is in setting of acute infection.  RR called 8/31 for HR 150s-160s.  BP stable, pt asymptomatic.   Chadsvasc 0  Telemetry has been normal sinus rhythm for 48 hours.  Troponins unremarkable  Cardiology referral on discharge

## 2024-09-02 NOTE — PROGRESS NOTES
Zafar Woodward is a 29 y.o. male who is currently ordered Vancomycin IV with management by the Pharmacy Consult service.  Relevant clinical data and objective / subjective history reviewed.  Vancomycin Assessment:  Indication and Goal AUC/Trough: Soft tissue (goal -600, trough >10)  Clinical Status: stable  Micro:     Renal Function:  SCr: 0.59 mg/dL  CrCl: 153.9 mL/min  Renal replacement: Not on dialysis  Days of Therapy: 3  Current Dose: 1500 mg every 12 hours  Vancomycin Plan:  New Dosing: continue current dose  Estimated AUC: 531 mcg*hr/mL  Estimated Trough: 13.1 mcg/mL  Next Level: 9/8 @ 0600  Renal Function Monitoring: Daily BMP and UOP  Pharmacy will continue to follow closely for s/sx of nephrotoxicity, infusion reactions and appropriateness of therapy.  BMP and CBC will be ordered per protocol. We will continue to follow the patient’s culture results and clinical progress daily.    Wen Salazar, Pharmacist

## 2024-09-02 NOTE — PLAN OF CARE
Problem: PAIN - ADULT  Goal: Verbalizes/displays adequate comfort level or baseline comfort level  Description: Interventions:  - Encourage patient to monitor pain and request assistance  - Assess pain using appropriate pain scale  - Administer analgesics based on type and severity of pain and evaluate response  - Implement non-pharmacological measures as appropriate and evaluate response  - Consider cultural and social influences on pain and pain management  - Notify physician/advanced practitioner if interventions unsuccessful or patient reports new pain  Outcome: Progressing     Problem: INFECTION - ADULT  Goal: Absence or prevention of progression during hospitalization  Description: INTERVENTIONS:  - Assess and monitor for signs and symptoms of infection  - Monitor lab/diagnostic results  - Monitor all insertion sites, i.e. indwelling lines, tubes, and drains  - Monitor endotracheal if appropriate and nasal secretions for changes in amount and color  - [place] appropriate cooling/warming therapies per order  - Administer medications as ordered  - Instruct and encourage patient and family to use good hand hygiene technique  - Identify and instruct in appropriate isolation precautions for identified infection/condition  Outcome: Progressing  Goal: Absence of fever/infection during neutropenic period  Description: INTERVENTIONS:  - Monitor WBC    Outcome: Progressing     Problem: SAFETY ADULT  Goal: Patient will remain free of falls  Description: INTERVENTIONS:  - Educate patient/family on patient safety including physical limitations  - Instruct patient to call for assistance with activity   - Consult OT/PT to assist with strengthening/mobility   - Keep Call bell within reach  - Keep bed low and locked with side rails adjusted as appropriate  - Keep care items and personal belongings within reach  - Initiate and maintain comfort rounds  - Make Fall Risk Sign visible to staff  - Offer Toileting every  Hours,  in advance of need  - Initiate/Maintain alarm  - Obtain necessary fall risk management equipment:   - Apply yellow socks and bracelet for high fall risk patients  - Consider moving patient to room near nurses station  Outcome: Progressing  Goal: Maintain or return to baseline ADL function  Description: INTERVENTIONS:  -  Assess patient's ability to carry out ADLs; assess patient's baseline for ADL function and identify physical deficits which impact ability to perform ADLs (bathing, care of mouth/teeth, toileting, grooming, dressing, etc.)  - Assess/evaluate cause of self-care deficits   - Assess range of motion  - Assess patient's mobility; develop plan if impaired  - Assess patient's need for assistive devices and provide as appropriate  - Encourage maximum independence but intervene and supervise when necessary  - Involve family in performance of ADLs  - Assess for home care needs following discharge   - Consider OT consult to assist with ADL evaluation and planning for discharge  - Provide patient education as appropriate  Outcome: Progressing  Goal: Maintains/Returns to pre admission functional level  Description: INTERVENTIONS:  - Perform AM-PAC 6 Click Basic Mobility/ Daily Activity assessment daily.  - Set and communicate daily mobility goal to care team and patient/family/caregiver.   - Collaborate with rehabilitation services on mobility goals if consulted  - Perform Range of Motion  times a day.  - Reposition patient every  hours.  - Dangle patient  times a day  - Stand patient  times a day  - Ambulate patient  times a day  - Out of bed to chair  times a day   - Out of bed for meals times a day  - Out of bed for toileting  - Record patient progress and toleration of activity level   Outcome: Progressing     Problem: DISCHARGE PLANNING  Goal: Discharge to home or other facility with appropriate resources  Description: INTERVENTIONS:  - Identify barriers to discharge w/patient and caregiver  - Arrange for  needed discharge resources and transportation as appropriate  - Identify discharge learning needs (meds, wound care, etc.)  - Arrange for interpretive services to assist at discharge as needed  - Refer to Case Management Department for coordinating discharge planning if the patient needs post-hospital services based on physician/advanced practitioner order or complex needs related to functional status, cognitive ability, or social support system  Outcome: Progressing     Problem: Knowledge Deficit  Goal: Patient/family/caregiver demonstrates understanding of disease process, treatment plan, medications, and discharge instructions  Description: Complete learning assessment and assess knowledge base.  Interventions:  - Provide teaching at level of understanding  - Provide teaching via preferred learning methods  Outcome: Progressing     Problem: Prexisting or High Potential for Compromised Skin Integrity  Goal: Skin integrity is maintained or improved  Description: INTERVENTIONS:  - Identify patients at risk for skin breakdown  - Assess and monitor skin integrity  - Assess and monitor nutrition and hydration status  - Monitor labs   - Assess for incontinence   - Turn and reposition patient  - Assist with mobility/ambulation  - Relieve pressure over bony prominences  - Avoid friction and shearing  - Provide appropriate hygiene as needed including keeping skin clean and dry  - Evaluate need for skin moisturizer/barrier cream  - Collaborate with interdisciplinary team   - Patient/family teaching  - Consider wound care consult   Outcome: Progressing

## 2024-09-02 NOTE — PROGRESS NOTES
Progress Note - Infectious Disease   Zafar Woodward 29 y.o. male MRN: [id number]  Unit/Bed#: S -01 Encounter: [id number]      Impression/Recommendations:  1.  Sepsis, with fever, tachycardia and leukocytosis.  Source of sepsis is most likely right medial thigh cellulitis.  Patient is clinically improved. Tachycardia has resolved.  Fever was along with temperature is finally down.  WBC is also finally decreasing.  Despite sepsis, he has remained systemically well, without toxicity and hemodynamically stable, without hypotension.  Admission blood cultures have no growth thus far.  Antibiotic plan as in below.  Monitor temperature/WBC.  Monitor hemodynamics  Follow-up on admission blood cultures.     2.  Right medial thigh cellulitis.  Patient is finally improved clinically.  Erythema is decreasing.  He is now developing superficial bulla, suggestive of beta-hemolytic streptococcal infection.  MRSA screen negative.  CT of thigh is without abscess.  Lyme is unlikely clinically.  Change antibiotic to IV cefazolin.  No further need for doxycycline.  Will discontinue.  Serial exam.  Monitor temperature/WBC.  Transition to p.o. antibiotic when patient is further clinically improved.     3.  New onset atrial fibrillation.  Management per primary service.     4.  Vasovagal syncope, surrounding peripheral IV placement.     Discussed with patient in detail regarding the above plan.  Discussed with Lauren Garcia PA-C from primary service regard antibiotic plan above.  She is in agreement.     Antibiotics:  Vancomycin/doxycycline # 3     Subjective:  Patient with stable improved pain in right medial thigh.  Temperature is trending down.  No further chills.  He is tolerating antibiotics well.  No nausea, vomiting or diarrhea.    Objective:  Vitals:  Temp:  [98.2 °F (36.8 °C)-101.8 °F (38.8 °C)] 98.2 °F (36.8 °C)  HR:  [85-94] 89  BP: (121-141)/(73-81) 137/81  SpO2:  [94 %-100 %] 94 %  Temp (24hrs), Av.9 °F (37.7 °C),  Min:98.2 °F (36.8 °C), Max:101.8 °F (38.8 °C)  Current: Temperature: 98.2 °F (36.8 °C)    Physical Exam:     General: Awake, alert, cooperative, no distress.   Neck:  Supple. No mass.  No lymphadenopathy.   Lungs: Expansion symmetric, no rales, no wheezing, respirations unlabored.   Heart:  Regular rate and rhythm, S1 and S2 normal, no murmur.   Abdomen: Soft, nondistended, non-tender, bowel sounds active all four quadrants, no masses, no organomegaly.   Extremities: Left medial thigh with stable area of induration.  Improving erythema/warmth.  Improved tenderness.  Development of superficial bulla.   Skin:  No rash.   Neuro: Moves all extremities.     Invasive Devices       Peripheral Intravenous Line  Duration             Peripheral IV 08/30/24 Distal;Left;Upper;Ventral (anterior) Arm 2 days    Peripheral IV 08/30/24 Right Antecubital 2 days                    Labs studies:   I have personally reviewed pertinent labs.  Results from last 7 days   Lab Units 09/02/24  0536 09/01/24  1003 08/31/24  0445 08/30/24  2343   POTASSIUM mmol/L 3.7 3.6 3.9 3.8   CHLORIDE mmol/L 107 108 109* 100   CO2 mmol/L 24 25 24 29   BUN mg/dL 6 6 10 12   CREATININE mg/dL 0.59* 0.57* 0.82 0.91   EGFR ml/min/1.73sq m 134 136 117 111   CALCIUM mg/dL 8.2* 8.1* 7.6* 8.7   AST U/L  --   --   --  12*   ALT U/L  --   --   --  17   ALK PHOS U/L  --   --   --  68     Results from last 7 days   Lab Units 09/02/24  0536 09/01/24  1003 08/31/24  0445   WBC Thousand/uL 11.56* 15.88* 13.68*   HEMOGLOBIN g/dL 12.9 12.6 13.8   PLATELETS Thousands/uL 179 169 181     Results from last 7 days   Lab Units 08/31/24  1141 08/30/24  2343   BLOOD CULTURE   --  No Growth at 48 hrs.  No Growth at 48 hrs.   MRSA CULTURE ONLY  No Methicillin Resistant Staphlyococcus aureus (MRSA) isolated  --        Imaging Studies:   I have personally reviewed pertinent imaging study reports and images in PACS.  Right thigh CT reviewed personally.  No abscess.    EKG, Pathology,  and Other Studies:   I have personally reviewed pertinent reports.

## 2024-09-02 NOTE — ASSESSMENT & PLAN NOTE
Noted episode of syncope shortly after peripheral IV placed and while vomiting.  Similar episodes in the past with blood draws.  IVF provided

## 2024-09-02 NOTE — ASSESSMENT & PLAN NOTE
Seen at urgent care and started on augmentin, doxycycline.  Noted rapidly increasing erythema and pain.  CT RLE c/w cellulitis, no abscess  Antibiotics: vancomycin, doxycycline--to be changed to IV Ancef today per ID for strong suspicion of streptococcal infection  Blistering noted to be slightly worsening (see image in media)  Pain control measures

## 2024-09-03 VITALS
WEIGHT: 189 LBS | DIASTOLIC BLOOD PRESSURE: 71 MMHG | BODY MASS INDEX: 27.99 KG/M2 | OXYGEN SATURATION: 97 % | TEMPERATURE: 98.2 F | HEART RATE: 71 BPM | RESPIRATION RATE: 17 BRPM | SYSTOLIC BLOOD PRESSURE: 127 MMHG | HEIGHT: 69 IN

## 2024-09-03 LAB
ANION GAP SERPL CALCULATED.3IONS-SCNC: 5 MMOL/L (ref 4–13)
ATRIAL RATE: 91 BPM
BASOPHILS # BLD AUTO: 0.02 THOUSANDS/ÂΜL (ref 0–0.1)
BASOPHILS NFR BLD AUTO: 0 % (ref 0–1)
BUN SERPL-MCNC: 7 MG/DL (ref 5–25)
CALCIUM SERPL-MCNC: 8.3 MG/DL (ref 8.4–10.2)
CHLORIDE SERPL-SCNC: 107 MMOL/L (ref 96–108)
CO2 SERPL-SCNC: 29 MMOL/L (ref 21–32)
CREAT SERPL-MCNC: 0.7 MG/DL (ref 0.6–1.3)
EOSINOPHIL # BLD AUTO: 0.21 THOUSAND/ÂΜL (ref 0–0.61)
EOSINOPHIL NFR BLD AUTO: 3 % (ref 0–6)
ERYTHROCYTE [DISTWIDTH] IN BLOOD BY AUTOMATED COUNT: 12.6 % (ref 11.6–15.1)
GFR SERPL CREATININE-BSD FRML MDRD: 125 ML/MIN/1.73SQ M
GLUCOSE SERPL-MCNC: 105 MG/DL (ref 65–140)
HCT VFR BLD AUTO: 37.7 % (ref 36.5–49.3)
HGB BLD-MCNC: 12.8 G/DL (ref 12–17)
IMM GRANULOCYTES # BLD AUTO: 0.06 THOUSAND/UL (ref 0–0.2)
IMM GRANULOCYTES NFR BLD AUTO: 1 % (ref 0–2)
LYMPHOCYTES # BLD AUTO: 1.27 THOUSANDS/ÂΜL (ref 0.6–4.47)
LYMPHOCYTES NFR BLD AUTO: 16 % (ref 14–44)
MAGNESIUM SERPL-MCNC: 2.1 MG/DL (ref 1.9–2.7)
MCH RBC QN AUTO: 29.9 PG (ref 26.8–34.3)
MCHC RBC AUTO-ENTMCNC: 34 G/DL (ref 31.4–37.4)
MCV RBC AUTO: 88 FL (ref 82–98)
MONOCYTES # BLD AUTO: 0.58 THOUSAND/ÂΜL (ref 0.17–1.22)
MONOCYTES NFR BLD AUTO: 7 % (ref 4–12)
NEUTROPHILS # BLD AUTO: 5.7 THOUSANDS/ÂΜL (ref 1.85–7.62)
NEUTS SEG NFR BLD AUTO: 73 % (ref 43–75)
NRBC BLD AUTO-RTO: 0 /100 WBCS
P AXIS: 67 DEGREES
PLATELET # BLD AUTO: 201 THOUSANDS/UL (ref 149–390)
PMV BLD AUTO: 9.3 FL (ref 8.9–12.7)
POTASSIUM SERPL-SCNC: 3.9 MMOL/L (ref 3.5–5.3)
PR INTERVAL: 132 MS
QRS AXIS: 65 DEGREES
QRSD INTERVAL: 82 MS
QT INTERVAL: 338 MS
QTC INTERVAL: 415 MS
RBC # BLD AUTO: 4.28 MILLION/UL (ref 3.88–5.62)
SODIUM SERPL-SCNC: 141 MMOL/L (ref 135–147)
T WAVE AXIS: 58 DEGREES
VENTRICULAR RATE: 91 BPM
WBC # BLD AUTO: 7.84 THOUSAND/UL (ref 4.31–10.16)

## 2024-09-03 PROCEDURE — 93010 ELECTROCARDIOGRAM REPORT: CPT | Performed by: INTERNAL MEDICINE

## 2024-09-03 PROCEDURE — 99232 SBSQ HOSP IP/OBS MODERATE 35: CPT | Performed by: INTERNAL MEDICINE

## 2024-09-03 PROCEDURE — 99239 HOSP IP/OBS DSCHRG MGMT >30: CPT | Performed by: PHYSICIAN ASSISTANT

## 2024-09-03 PROCEDURE — 83735 ASSAY OF MAGNESIUM: CPT | Performed by: PHYSICIAN ASSISTANT

## 2024-09-03 PROCEDURE — 80048 BASIC METABOLIC PNL TOTAL CA: CPT | Performed by: INTERNAL MEDICINE

## 2024-09-03 PROCEDURE — 85025 COMPLETE CBC W/AUTO DIFF WBC: CPT | Performed by: PHYSICIAN ASSISTANT

## 2024-09-03 RX ORDER — ACETAMINOPHEN 325 MG/1
650 TABLET ORAL EVERY 6 HOURS PRN
Start: 2024-09-03

## 2024-09-03 RX ORDER — CEPHALEXIN 500 MG/1
500 CAPSULE ORAL EVERY 6 HOURS SCHEDULED
Qty: 28 CAPSULE | Refills: 0 | Status: SHIPPED | OUTPATIENT
Start: 2024-09-03 | End: 2024-09-10

## 2024-09-03 RX ADMIN — CEFAZOLIN SODIUM 2000 MG: 2 SOLUTION INTRAVENOUS at 04:12

## 2024-09-03 NOTE — PROGRESS NOTES
Progress Note - Infectious Disease   Zafar Woodward 29 y.o. male MRN: [id number]  Unit/Bed#: S -01 Encounter: [id number]      Impression/Recommendations:  1.  Sepsis, with fever, tachycardia and leukocytosis.  Source of sepsis is most likely right medial thigh cellulitis.  Patient is clinically improved. Tachycardia has resolved.  Fever was somewhat prolonged but now resolved.  WBC has finalized.  Despite sepsis, he has remained systemically well, without toxicity and hemodynamically stable, without hypotension.  Admission blood cultures have no growth thus far.  Antibiotic plan as in below.  Monitor temperature/WBC.  Monitor hemodynamics  Follow-up on admission blood cultures.     2.  Right medial thigh cellulitis.  Cellulitis now has the appearance of streptococcal infection.  Patient is clinically much improved.  MRSA screen negative.  CT of thigh is without abscess.  Lyme is unlikely clinically.  Change antibiotic to p.o. Keflex.  Treat x 10 total antibiotic days..     3.  New onset atrial fibrillation.  Management per primary service.     4.  Vasovagal syncope, surrounding peripheral IV placement.     Discussed with patient in detail regarding the above plan.  Discussed with Lauren Garcia PA-C from primary service regard antibiotic change above and ID clearance for discharge.  She is in agreement and will be discharging patient today.     Antibiotics:  Cefazolin  Antibiotic # 4     Subjective:  Patient with much improved pain in right medial thigh.  Temperature this down.  No further chills.  He is tolerating antibiotics well.  No nausea, vomiting or diarrhea.    Objective:  Vitals:  Temp:  [98.2 °F (36.8 °C)-98.3 °F (36.8 °C)] 98.2 °F (36.8 °C)  HR:  [66-71] 71  Resp:  [17] 17  BP: (120-127)/(71-78) 127/71  SpO2:  [97 %-98 %] 97 %  Temp (24hrs), Av.2 °F (36.8 °C), Min:98.2 °F (36.8 °C), Max:98.3 °F (36.8 °C)  Current: Temperature: 98.2 °F (36.8 °C)    Physical Exam:     General: Awake, alert,  cooperative, no distress.   Neck:  Supple. No mass.  No lymphadenopathy.   Lungs: Expansion symmetric, no rales, no wheezing, respirations unlabored.   Heart:  Regular rate and rhythm, S1 and S2 normal, no murmur.   Abdomen: Soft, nondistended, non-tender, bowel sounds active all four quadrants, no masses, no organomegaly.   Extremities: Right medial thigh with improved induration, erythema, warmth.  Minimal tenderness now.  No fluctuance.  Stable small bulla with mild serous drainage.  No purulence.   Skin:  No rash.   Neuro: Moves all extremities.     Invasive Devices       Peripheral Intravenous Line  Duration             Peripheral IV 08/30/24 Right Antecubital 3 days                    Labs studies:   I have personally reviewed pertinent labs.  Results from last 7 days   Lab Units 09/03/24  0458 09/02/24  0536 09/01/24  1003 08/31/24  0445 08/30/24  2343   POTASSIUM mmol/L 3.9 3.7 3.6   < > 3.8   CHLORIDE mmol/L 107 107 108   < > 100   CO2 mmol/L 29 24 25   < > 29   BUN mg/dL 7 6 6   < > 12   CREATININE mg/dL 0.70 0.59* 0.57*   < > 0.91   EGFR ml/min/1.73sq m 125 134 136   < > 111   CALCIUM mg/dL 8.3* 8.2* 8.1*   < > 8.7   AST U/L  --   --   --   --  12*   ALT U/L  --   --   --   --  17   ALK PHOS U/L  --   --   --   --  68    < > = values in this interval not displayed.     Results from last 7 days   Lab Units 09/03/24  0458 09/02/24  0536 09/01/24  1003   WBC Thousand/uL 7.84 11.56* 15.88*   HEMOGLOBIN g/dL 12.8 12.9 12.6   PLATELETS Thousands/uL 201 179 169     Results from last 7 days   Lab Units 08/31/24  1141 08/30/24  2343   BLOOD CULTURE   --  No Growth at 72 hrs.  No Growth at 72 hrs.   MRSA CULTURE ONLY  No Methicillin Resistant Staphlyococcus aureus (MRSA) isolated  --        Imaging Studies:   I have personally reviewed pertinent imaging study reports and images in PACS.    EKG, Pathology, and Other Studies:   I have personally reviewed pertinent reports.

## 2024-09-03 NOTE — ASSESSMENT & PLAN NOTE
repleted    Lab Results   Component Value Date/Time    MG 2.1 09/03/2024 04:58 AM    MG 1.8 (L) 09/02/2024 05:36 AM    MG 1.8 (L) 09/01/2024 10:03 AM

## 2024-09-03 NOTE — DISCHARGE INSTR - AVS FIRST PAGE
Dear Zafar Woodward,     It was our pleasure to care for you here at [facility].  It is our hope that we were always able to exceed the expected standards for your care during your stay.  You were hospitalized due to cellulitis of the right leg.  You were cared for on the 3rd floor by Lauren Urias PA-C under the service of Salma Gong MD with the [facility] who covers for your primary care physician (PCP), Juan Jose Petersen DO, while you were hospitalized.  If you have any questions or concerns related to this hospitalization, you may contact us at .  For follow up as well as any medication refills, we recommend that you follow up with your primary care physician.  A registered nurse will reach out to you by phone within a few days after your discharge to answer any additional questions that you may have after going home.  However, at this time we provide for you here, the most important instructions / recommendations at discharge:     Notable Medication Adjustments -   Keflex 500 mg 4 times a day for 7 days  Testing Required after Discharge -   Defer to cardiology  ** Please contact your PCP to request testing orders for any of the testing recommended here **  Important follow up information -   Family doctor  Cardiology--internal referral was placed  Other Instructions -   Excessive alcohol intake can trigger events of atrial fibrillation  Avoid picking at the blister on your leg.  If the blister pops you can put a gauze dressing on it.  You may shower but do not bathe or go in the pool until it is fully healed.  Avoid heavy your kids touches the area directly.  You can wear pants to the discourage this  Please review this entire after visit summary as additional general instructions including medication list, appointments, activity, diet, any pertinent wound care, and other additional recommendations from your care team that  may be provided for you.      Sincerely,     Lauren Urias PA-C

## 2024-09-03 NOTE — UTILIZATION REVIEW
Initial Clinical Review    Admission: Date/Time/Statement:   Admission Orders (From admission, onward)       Ordered        08/31/24 0112  INPATIENT ADMISSION  Once                          Orders Placed This Encounter   Procedures    INPATIENT ADMISSION     Standing Status:   Standing     Number of Occurrences:   1     Order Specific Question:   Level of Care     Answer:   Med Surg [16]     Order Specific Question:   Estimated length of stay     Answer:   More than 2 Midnights     Order Specific Question:   Certification     Answer:   I certify that inpatient services are medically necessary for this patient for a duration of greater than two midnights. See H&P and MD Progress Notes for additional information about the patient's course of treatment.     ED Arrival Information       Expected   -    Arrival   8/30/2024 22:47    Acuity   Urgent              Means of arrival   Walk-In    Escorted by   Self    Service   Hospitalist    Admission type   Emergency              Arrival complaint   Fever/Sore body and throat/Insect Bite             Chief Complaint   Patient presents with    Fever     Patient found a red lump on back of right thigh that he through was a pimple. Has progressed in swelling and redness with pain upon ambulation. Now having fevers, HA, vomiting, chills, and flu like symptoms. Urgent care urged to go to ED for further evaluation.       Initial Presentation: 29 y.o. male w/o PMH who presents to ED with rapidly increasing erythema and pain to posterior right thigh with fever, chills. .  He noted a small bump earlier in the day and was seen at urgent care.  He was prescribed augmentin and doxycycline.  He is unsure if got bitten by a bug. Poor po intake x 1 day. Pt had  syncopal event shortly after having IV placed and while vomiting. Rapid response called. Nurse reports he converted to atrial fibrillation at that time with rate 150-160's. On exam, R medial/posterior thigh with redness, approx 5x5 cm  ,with centralized papular lesion w/ scab noted .Warm to touch .  Area marked, see photos. Pt febrile 102.1 F. Labs : WBC 17.59, procal 0.63, CRP elevated . Mag 1.9 XR R femur shows nothing acute  Pt given IVF,  IV abx, IV Tylenol, IV antiemetic . Lyte repletion in ED. Pt admitted as Inpatient to telemetry with cellulitis RLE, sepsis, new A fib. Plan- telemetry. IV abx- Vanco, Doxycycline . Serial exams RLE . PRN Benadryl for itching from vanco . F/U blood cx . F/U Lyme titer. ID consult.   Anticipated Length of Stay/Certification Statement: Patient will be admitted on an inpatient basis with an anticipated length of stay of greater than 2 midnights secondary to IV abx.    Rapid response :new onset afib with rates in the 120's - in the setting of cellulitis with fever of 102.1 Pt states that he gets dizzy whenever he has blood drawn, had brief syncopal event (woke immediately and was AAOx4) - continue cardiac monitoring and close blood pressure monitoring  . Give 500cc Isolyte IV bolus, tylenol for fever, and 2g magnesium     ID consult - sepsis. R medial thigh cellulitis . IV abx- vanco . Can continue doxycycline for now but changed to p.o. Follow-up on Lyme screen. F/U blood cx . Monitor temp, WBC's . Serial exams R thigh.       Date: 9/1    Day 2:    Temperature and WBC remain up.  Tachycardia has resolved.Blood cultures negative at 24 hours .  Redness appears to be expanding on thigh, area re outlined    Right medial thigh with stable induration per ID . + erythema/warmth. Stable mild tenderness. No fluctuance.Having pain in his leg when he puts weight on it , when ambulates limiting his activity levels.  If his fever and leukocytosis do not resolve in the next 24 hours, ID recommends CT R thigh  leg. Lyme screen is negative but if patient does indeed have acute Lyme disease, Lyme serology would be negative. For now, with lack of improvement, we will stay with current antibiotic regimen without de-escalation. F/U  MRSA swab . Now back in normal sinus rhythm, will monitor another 24 hours on telemetry           8/31 RLE      9/1 RLE       9/1 RLE         ED Triage Vitals   Temperature Pulse Respirations Blood Pressure SpO2 Pain Score   08/30/24 2258 08/30/24 2258 08/30/24 2258 08/30/24 2258 08/30/24 2258 08/31/24 0326   (!) 102.1 °F (38.9 °C) 91 18 118/64 100 % No Pain     Weight (last 2 days)       None            Vital Signs (last 3 days)       Date/Time Temp Pulse Resp BP MAP (mmHg) SpO2 O2 Device Patient Position - Orthostatic VS Pain    09/03/24 07:22:33 98.2 °F (36.8 °C) 71 -- 127/71 90 97 % -- -- --    09/03/24 00:23:01 98.2 °F (36.8 °C) 66 -- 124/78 93 98 % -- -- --    09/02/24 2058 -- -- -- -- -- -- -- -- 4    09/02/24 1503 -- -- -- -- -- -- -- -- 6    09/02/24 15:00:55 98.3 °F (36.8 °C) -- 17 120/75 90 -- -- -- --    09/02/24 07:53:02 98.2 °F (36.8 °C) 89 -- 137/81 100 94 % -- -- --    09/02/24 0700 -- -- -- -- -- -- -- -- No Pain    09/01/24 21:21:57 99.8 °F (37.7 °C) 85 -- 121/73 89 100 % -- -- --    09/01/24 1930 -- -- -- -- -- -- None (Room air) -- No Pain    09/01/24 1559 -- -- -- -- -- -- -- -- Med Not Given for Pain - for MAR use only    09/01/24 15:24:40 101.8 °F (38.8 °C) 94 -- 141/76 98 100 % -- -- --    09/01/24 0838 -- -- -- -- -- -- -- -- 6    09/01/24 07:35:53 99.8 °F (37.7 °C) 88 -- 136/72 93 97 % -- -- --    09/01/24 0700 -- -- -- -- -- -- -- -- No Pain    09/01/24 0015 100.4 °F (38 °C) 92 -- -- -- -- -- -- --    08/31/24 2340 101.2 °F (38.4 °C) 94 -- -- -- -- -- -- --    08/31/24 23:00:50 102.9 °F (39.4 °C) 99 -- -- -- 98 % -- -- --    08/31/24 2000 -- -- -- -- -- 97 % None (Room air) -- No Pain    08/31/24 17:47:36 98.6 °F (37 °C) 87 -- -- -- 94 % -- -- --    08/31/24 1513 -- -- -- -- -- -- -- -- Med Not Given for Pain - for MAR use only    08/31/24 15:09:52 99.9 °F (37.7 °C) 100 -- 148/77 101 97 % -- -- --    08/31/24 0800 -- -- -- -- -- -- -- -- No Pain    08/31/24 07:52:45 99.7 °F (37.6 °C) 93 --  126/75 92 100 % -- -- --    08/31/24 0400 -- -- -- -- -- 97 % None (Room air) -- No Pain    08/31/24 0326 -- -- -- -- -- -- -- -- No Pain    08/31/24 0321 98.8 °F (37.1 °C) 118 20 117/67 84 95 % None (Room air) Lying --    08/31/24 0225 -- 140 20 105/58 -- 95 % None (Room air) -- --    08/31/24 0224 -- 148 -- -- -- 97 % -- -- --    08/31/24 0221 -- 136 -- 115/57 -- 97 % -- -- --    08/31/24 02:20:08 -- -- -- -- -- -- None (Room air) -- --    08/31/24 02:19:42 -- -- -- -- -- 96 % -- -- --    08/31/24 02:19:11 98.7 °F (37.1 °C) 140 16 124/55 -- 97 % None (Room air) Lying --    08/31/24 0218 -- 136 -- 124/55 -- 97 % -- -- --    08/31/24 0215 -- 95 18 92/65 75 96 % None (Room air) Lying --    08/31/24 0200 -- 131 18 98/62 72 96 % None (Room air) Lying --    08/31/24 0154 -- 130 20 107/68 81 97 % None (Room air) Lying --    08/31/24 0145 -- 125 16 95/60 71 96 % None (Room air) Lying --    08/31/24 0130 -- 123 18 119/57 77 96 % None (Room air) Sitting --    08/31/24 0115 -- 124 18 109/60 76 96 % None (Room air) Lying --    08/31/24 0100 -- 128 18 94/55 69 96 % None (Room air) Lying --    08/31/24 0045 -- 124 20 112/54 78 96 % None (Room air) Lying --    08/31/24 0030 -- 121 20 116/56 80 96 % None (Room air) Lying --    08/31/24 0015 -- 125 18 114/58 77 96 % None (Room air) Lying --    08/31/24 0000 -- 123 20 116/57 82 94 % None (Room air) Lying --              Pertinent Labs/Diagnostic Test Results:   Radiology:  CT lower extremity w contrast right   Final Interpretation by Cheri Watts MD (09/01 1850)      No intramuscular fluid collection seen.      Cellulitic changes in the mid to distal thigh and at the level of the knee      Small knee effusion      Workstation performed: [id number]         XR chest 1 view portable   Final Interpretation by Ramana Landa MD (09/01 1308)      No acute cardiopulmonary disease.            Workstation performed: [id number]         XR femur 2 views RIGHT   Final Interpretation  by Ramana Landa MD (09/01 1308)      No acute osseous abnormality.         Computerized Assisted Algorithm (CAA) may have been used to analyze all applicable images.         Workstation performed: [id number]           Cardiology: 8/30 ECG- NSR, HR 91 .         Results from last 7 days   Lab Units 09/03/24 0458 09/02/24 0536 09/01/24  1003 08/31/24 0445 08/30/24  2343   WBC Thousand/uL 7.84 11.56* 15.88* 13.68* 17.59*   HEMOGLOBIN g/dL 12.8 12.9 12.6 13.8 14.8   HEMATOCRIT % 37.7 37.1 36.7 39.6 42.1   PLATELETS Thousands/uL 201 179 169 181 203   TOTAL NEUT ABS Thousands/µL 5.70  --   --  11.64*  --    BANDS PCT %  --   --   --   --  5         Results from last 7 days   Lab Units 09/03/24 0458 09/02/24 0536 09/01/24  1003 08/31/24 0445 08/30/24  2343   SODIUM mmol/L 141 138 138 138 135   POTASSIUM mmol/L 3.9 3.7 3.6 3.9 3.8   CHLORIDE mmol/L 107 107 108 109* 100   CO2 mmol/L 29 24 25 24 29   ANION GAP mmol/L 5 7 5 5 6   BUN mg/dL 7 6 6 10 12   CREATININE mg/dL 0.70 0.59* 0.57* 0.82 0.91   EGFR ml/min/1.73sq m 125 134 136 117 111   CALCIUM mg/dL 8.3* 8.2* 8.1* 7.6* 8.7   MAGNESIUM mg/dL 2.1 1.8* 1.8* 2.8* 1.9     Results from last 7 days   Lab Units 08/30/24  2343   AST U/L 12*   ALT U/L 17   ALK PHOS U/L 68   TOTAL PROTEIN g/dL 6.3*   ALBUMIN g/dL 4.0   TOTAL BILIRUBIN mg/dL 0.91         Results from last 7 days   Lab Units 09/03/24 0458 09/02/24 0536 09/01/24  1003 08/31/24 0445 08/30/24  2343   GLUCOSE RANDOM mg/dL 105 98 135 113 131               Results from last 7 days   Lab Units 08/30/24  2343   PROTIME seconds 17.0*   INR  1.31*   PTT seconds 28         Results from last 7 days   Lab Units 09/01/24  1003 08/31/24  0445 08/30/24  2343   PROCALCITONIN ng/ml 0.39* 0.61* 0.63*     Results from last 7 days   Lab Units 08/30/24  2343   LACTIC ACID mmol/L 1.0             Results from last 7 days   Lab Units 08/30/24  2343   BNP pg/mL 71           Results from last 7 days   Lab Units 08/30/24  2343    CRP mg/L 88.6*             Results from last 7 days   Lab Units 08/31/24  0207   CLARITY UA  Clear   COLOR UA  Light Yellow   SPEC GRAV UA  1.013   PH UA  7.0   GLUCOSE UA mg/dl Negative   KETONES UA mg/dl Negative   BLOOD UA  Negative   PROTEIN UA mg/dl Negative   NITRITE UA  Negative   BILIRUBIN UA  Negative   UROBILINOGEN UA (BE) mg/dl <2.0   LEUKOCYTES UA  Negative                   Results from last 7 days   Lab Units 08/30/24  2343   BLOOD CULTURE  No Growth at 72 hrs.  No Growth at 72 hrs.             Results from last 7 days   Lab Units 09/01/24  1003   VANCOMYCIN TR ug/mL 37.0*       ED Treatment-Medication Administration from 08/30/2024 2247 to 08/31/2024 0319         Date/Time Order Dose Route Action     08/30/2024 2351 sodium chloride 0.9 % bolus 1,000 mL 1,000 mL Intravenous New Bag     08/31/2024 0016 vancomycin (VANCOCIN) 1,750 mg in sodium chloride 0.9 % 500 mL IVPB 1,750 mg Intravenous New Bag     08/31/2024 0215 vancomycin (VANCOCIN) 1,750 mg in sodium chloride 0.9 % 500 mL IVPB -- Intravenous Restarted     08/30/2024 2358 acetaminophen (Ofirmev) injection 1,000 mg 1,000 mg Intravenous New Bag     08/30/2024 2349 sodium chloride 0.9 % bolus 1,000 mL 1,000 mL Intravenous New Bag     08/30/2024 2357 ondansetron (ZOFRAN) injection 4 mg 4 mg Intravenous Given     08/30/2024 2357 Famotidine (PF) (PEPCID) injection 20 mg 20 mg Intravenous Given     08/31/2024 0150 lactated ringers infusion 75 mL/hr Intravenous New Bag     08/31/2024 0236 potassium chloride (Klor-Con M20) CR tablet 40 mEq 40 mEq Oral Given     08/31/2024 0248 magnesium sulfate 2 g/50 mL IVPB (premix) 2 g 2 g Intravenous New Bag     08/31/2024 0228 diphenhydrAMINE (BENADRYL) injection 25 mg 25 mg Intravenous Given     08/31/2024 0239 multi-electrolyte (ISOLYTE-S PH 7.4) bolus 500 mL 500 mL Intravenous New Bag            Past Medical History:   Diagnosis Date    COVID-19 12/26/2021    No pertinent past medical history      Present on  Admission:  **None**      Admitting Diagnosis: Fever [R50.9]  Cellulitis of right leg [L03.115]  Sepsis (HCC) [A41.9]  Age/Sex: 29 y.o. male  Admission Orders:  Scheduled Medications:  cefazolin, 2,000 mg, Intravenous, Q8H  docusate sodium, 100 mg, Oral, BID  enoxaparin, 40 mg, Subcutaneous, Daily    vancomycin (VANCOCIN) 1,750 mg in sodium chloride 0.9 % 500 mL IVPB  Dose: 1,750 mg  Freq: Every 12 hours Route: IV  Last Dose: 1,750 mg (09/01/24 0839)  Start: 08/31/24 0800 End: 09/01/24 1237  doxycycline (VIBRAMYCIN) 100 mg in sodium chloride 0.9 % 100 mL IVPB  Dose: 100 mg  Freq: Every 12 hours Route: IV  Indications Comment: lyme pending  Last Dose: 100 mg (08/31/24 0400)  Start: 08/31/24 0130 End: 08/31/24 1123      Continuous IV Infusions:       PRN Meds:  acetaminophen, 975 mg, Oral, Q8H PRN x1 9/1   diphenhydrAMINE, 25 mg, Intravenous, Q6H PRN x 3 8/31, x1 9/1   melatonin, 3 mg, Oral, HS PRN  metoprolol, 5 mg, Intravenous, Q6H PRN  ondansetron, 4 mg, Intravenous, Q8H PRN  oxyCODONE, 2.5 mg, Oral, Q4H PRN   Or  oxyCODONE, 5 mg, Oral, Q4H PRN  polyethylene glycol, 17 g, Oral, Daily PRN  Dose: 650 mg  Freq: Every 6 hours PRN Route: PO  PRN Reason: mild pain  Indications of Use: FEVER,HEADACHE,MILD PAIN  Start: 08/31/24 0113 End: 09/01/24 1204 x1 8/31       Reg diet   OOB as humaira   Telemetry      IP CONSULT TO INFECTIOUS DISEASES    Network Utilization Review Department  ATTENTION: Please call with any questions or concerns to [phone] and carefully listen to the prompts so that you are directed to the right person. All voicemails are confidential.   For Discharge needs, contact Care Management DC Support Team at [phone] opt. [phone]  Send all requests for admission clinical reviews, approved or denied determinations and any other requests to dedicated fax number below belonging to the campus where the patient is receiving treatment. List of dedicated fax numbers for the Facilities:  FACILITY NAME UR FAX NUMBER    ADMISSION DENIALS (Administrative/Medical Necessity) [phone]   DISCHARGE SUPPORT TEAM (NETWORK) [phone]   PARENT CHILD HEALTH (Maternity/NICU/Pediatrics) [phone]   [facility] [phone]   [facility] [phone]   [facility] [phone]   [facility] [phone]   [facility] [phone]   [facility] [phone]   [facility] [phone]   [facility] [phone]   [facility] [phone]   [facility] [phone]   [facility] [phone]   [facility] [phone]

## 2024-09-03 NOTE — PLAN OF CARE
Problem: PAIN - ADULT  Goal: Verbalizes/displays adequate comfort level or baseline comfort level  Description: Interventions:  - Encourage patient to monitor pain and request assistance  - Assess pain using appropriate pain scale  - Administer analgesics based on type and severity of pain and evaluate response  - Implement non-pharmacological measures as appropriate and evaluate response  - Consider cultural and social influences on pain and pain management  - Notify physician/advanced practitioner if interventions unsuccessful or patient reports new pain  Outcome: Adequate for Discharge     Problem: INFECTION - ADULT  Goal: Absence or prevention of progression during hospitalization  Description: INTERVENTIONS:  - Assess and monitor for signs and symptoms of infection  - Monitor lab/diagnostic results  - Monitor all insertion sites, i.e. indwelling lines, tubes, and drains  - Monitor endotracheal if appropriate and nasal secretions for changes in amount and color  - [place] appropriate cooling/warming therapies per order  - Administer medications as ordered  - Instruct and encourage patient and family to use good hand hygiene technique  - Identify and instruct in appropriate isolation precautions for identified infection/condition  Outcome: Adequate for Discharge  Goal: Absence of fever/infection during neutropenic period  Description: INTERVENTIONS:  - Monitor WBC    Outcome: Adequate for Discharge     Problem: SAFETY ADULT  Goal: Patient will remain free of falls  Description: INTERVENTIONS:  - Educate patient/family on patient safety including physical limitations  - Instruct patient to call for assistance with activity   - Consult OT/PT to assist with strengthening/mobility   - Keep Call bell within reach  - Keep bed low and locked with side rails adjusted as appropriate  - Keep care items and personal belongings within reach  - Initiate and maintain comfort rounds  - Make Fall Risk Sign visible to  staff    - Apply yellow socks and bracelet for high fall risk patients  - Consider moving patient to room near nurses station  Outcome: Adequate for Discharge  Goal: Maintain or return to baseline ADL function  Description: INTERVENTIONS:  -  Assess patient's ability to carry out ADLs; assess patient's baseline for ADL function and identify physical deficits which impact ability to perform ADLs (bathing, care of mouth/teeth, toileting, grooming, dressing, etc.)  - Assess/evaluate cause of self-care deficits   - Assess range of motion  - Assess patient's mobility; develop plan if impaired  - Assess patient's need for assistive devices and provide as appropriate  - Encourage maximum independence but intervene and supervise when necessary  - Involve family in performance of ADLs  - Assess for home care needs following discharge   - Consider OT consult to assist with ADL evaluation and planning for discharge  - Provide patient education as appropriate  Outcome: Adequate for Discharge  Goal: Maintains/Returns to pre admission functional level  Description: INTERVENTIONS:  - Perform AM-PAC 6 Click Basic Mobility/ Daily Activity assessment daily.  - Set and communicate daily mobility goal to care team and patient/family/caregiver.   - Collaborate with rehabilitation services on mobility goals if consulted  - Out of bed for meals 3 times a day  - Out of bed for toileting  - Record patient progress and toleration of activity level   Outcome: Adequate for Discharge     Problem: DISCHARGE PLANNING  Goal: Discharge to home or other facility with appropriate resources  Description: INTERVENTIONS:  - Identify barriers to discharge w/patient and caregiver  - Arrange for needed discharge resources and transportation as appropriate  - Identify discharge learning needs (meds, wound care, etc.)  - Arrange for interpretive services to assist at discharge as needed  - Refer to Case Management Department for coordinating discharge  planning if the patient needs post-hospital services based on physician/advanced practitioner order or complex needs related to functional status, cognitive ability, or social support system  Outcome: Adequate for Discharge     Problem: Knowledge Deficit  Goal: Patient/family/caregiver demonstrates understanding of disease process, treatment plan, medications, and discharge instructions  Description: Complete learning assessment and assess knowledge base.  Interventions:  - Provide teaching at level of understanding  - Provide teaching via preferred learning methods  Outcome: Adequate for Discharge     Problem: Prexisting or High Potential for Compromised Skin Integrity  Goal: Skin integrity is maintained or improved  Description: INTERVENTIONS:  - Identify patients at risk for skin breakdown  - Assess and monitor skin integrity  - Assess and monitor nutrition and hydration status  - Monitor labs   - Assess for incontinence   - Turn and reposition patient  - Assist with mobility/ambulation  - Relieve pressure over bony prominences  - Avoid friction and shearing  - Provide appropriate hygiene as needed including keeping skin clean and dry  - Evaluate need for skin moisturizer/barrier cream  - Collaborate with interdisciplinary team   - Patient/family teaching  - Consider wound care consult   Outcome: Adequate for Discharge

## 2024-09-03 NOTE — ASSESSMENT & PLAN NOTE
Fever, tachycardia, leukocytosis.  Lactate normal.  Fever was persistent and now finally improving. WBC count now normal  Source: RLE cellulitis   Antibiotics: vancomycin, doxycycline changed to IV ancef per ID, now to PO keflex  Blood cultures negative

## 2024-09-03 NOTE — ASSESSMENT & PLAN NOTE
Seen at urgent care and started on augmentin, doxycycline.  Noted rapidly increasing erythema and pain.  CT RLE c/w cellulitis, no abscess  Antibiotics: vancomycin, doxycycline--changed to IV Ancef on 9/2/24 per ID for strong suspicion of streptococcal infection given evidence of blistering. Much improved, change to PO keflex for 7 more days  Pain control measures

## 2024-09-03 NOTE — ASSESSMENT & PLAN NOTE
New afib following episode of vomiting.  Suspect this is in setting of acute infection.  RR called 8/31 for HR 150s-160s.  BP stable, pt asymptomatic.   Chadsvasc 0  Telemetry was normal sinus rhythm for 48 hours before dc'd.  Troponins unremarkable  Educated patient of potential for alcohol to trigger recurrent A-fib  Cardiology referral on discharge

## 2024-09-03 NOTE — UTILIZATION REVIEW
NOTIFICATION OF INPATIENT ADMISSION   AUTHORIZATION REQUEST   SERVICING FACILITY:   [place] [address]  Tax ID: [id number]  NPI: [id number]   ATTENDING PROVIDER:  Attending Name and NPI#: Salma Gong Md [[id number]]  Address: [address] [place] [address]  Phone: [phone]     ADMISSION INFORMATION:  Place of Service: Inpatient [facility]  Place of Service Code: 21  Inpatient Admission Date/Time: 8/31/24  1:12 AM  Discharge Date/Time: No discharge date for patient encounter.  Admitting Diagnosis Code/Description:  Fever [R50.9]  Cellulitis of right leg [L03.115]  Sepsis (HCC) [A41.9]     UTILIZATION REVIEW CONTACT:  Betty Mcguire Utilization   Network Utilization Review Department  Phone: [phone]  Fax: [phone]  Email: [email or web address]  Contact for approvals/pending authorizations, clinical reviews, and discharge.     PHYSICIAN ADVISORY SERVICES:  Medical Necessity Denial & Lvlu-mf-Tppu Review  Phone: [phone]  Fax: [phone]  Email: [email or web address]     DISCHARGE SUPPORT TEAM:  For Patients Discharge Needs & Updates  Phone: [phone] opt. 2 Fax: [phone]  Email: [email or web address]

## 2024-09-03 NOTE — DISCHARGE SUMMARY
[facility]  Discharge- Zafar Woodward 1995, 29 y.o. male MRN: [id number]  Unit/Bed#: S -01 Encounter: [id number]  Primary Care Provider: Juan Jose Petersen DO   Date and time admitted to hospital: 8/30/2024 10:53 PM    * Cellulitis of right lower extremity  Assessment & Plan  Seen at urgent care and started on augmentin, doxycycline.  Noted rapidly increasing erythema and pain.  CT RLE c/w cellulitis, no abscess  Antibiotics: vancomycin, doxycycline--changed to IV Ancef on 9/2/24 per ID for strong suspicion of streptococcal infection given evidence of blistering. Much improved, change to PO keflex for 7 more days  Pain control measures    New onset atrial fibrillation (HCC)  Assessment & Plan  New afib following episode of vomiting.  Suspect this is in setting of acute infection.  RR called 8/31 for HR 150s-160s.  BP stable, pt asymptomatic.   Chadsvasc 0  Telemetry was normal sinus rhythm for 48 hours before dc'd.  Troponins unremarkable  Educated patient of potential for alcohol to trigger recurrent A-fib  Cardiology referral on discharge    Sepsis (HCC)  Assessment & Plan  Fever, tachycardia, leukocytosis.  Lactate normal.  Fever was persistent and now finally improving. WBC count now normal  Source: RLE cellulitis   Antibiotics: vancomycin, doxycycline changed to IV ancef per ID, now to PO keflex  Blood cultures negative      Hypomagnesemia  Assessment & Plan  repleted    Lab Results   Component Value Date/Time    MG 2.1 09/03/2024 04:58 AM    MG 1.8 (L) 09/02/2024 05:36 AM    MG 1.8 (L) 09/01/2024 10:03 AM        Vasovagal syncope-resolved as of 9/2/2024  Assessment & Plan  Noted episode of syncope shortly after peripheral IV placed and while vomiting.  Similar episodes in the past with blood draws.  IVF provided        Medical Problems       Resolved Problems  Date Reviewed: 9/3/2024            Resolved    Vasovagal syncope 9/2/2024     Resolved by  Lauren Urias PA-C         Discharging Physician / Practitioner: Lauren Urias PA-C  PCP: Juan Jose Petersen,   Admission Date:   Admission Orders (From admission, onward)       Ordered        08/31/24 0112  INPATIENT ADMISSION  Once                          Discharge Date: 09/03/24    Consultations During Hospital Stay:  Infectious disease    Procedures Performed:   CT LE    Significant Findings / Test Results:   As above    Incidental Findings:   none     Test Results Pending at Discharge (will require follow up):   none     Outpatient Tests Requested:  Eval by cardiology    Complications:  rapid afib. Vasovagal syncope    Reason for Admission: redness of right thigh    Hospital Course:   Zafar Woodward is a 29 y.o. male patient who originally presented to the hospital on 8/30/2024 due to redness on an area of his medial right thigh.  He initially noted a bump there and thought he had perhaps been bitten by something.  He went to urgent care and was prescribed Augmentin and doxycycline.  Symptoms worsened and he presented to the hospital meeting sepsis criteria.  His Lyme test came back negative.  He was initially treated with IV vancomycin and doxycycline.  He was seen by infectious disease due to persistent fever.  CT of the lower extremity was consistent with cellulitis without abscess.  MRSA screen came back negative and suspicion for Lyme disease was low therefore Vanco and doxycycline were discontinued in favor of adding IV Ancef for suspicion of streptococcal infection given evidence of delayed blistering of the wound.  Patient improved significantly overnight and will be transition to oral Keflex for 7 more days, to follow-up with PCP.    While hospitalized patient transiently had a bout of rapid A-fib which was felt to be triggered by infection.  He was monitored on telemetry but no additional events occurred.  In addition he had event of vasovagal syncope during a blood draw.  His sepsis resolved and there have been no  "additional cardiac events.  However, a cardiology referral was placed on the last for completeness.      Please see above list of diagnoses and related plan for additional information.     Condition at Discharge: stable    Discharge Day Visit / Exam:   Subjective: Patient doing better at this time.  Less pain and redness noted.  Feels ready to go home.  Vitals: Blood Pressure: 127/71 (09/03/24 0722)  Pulse: 71 (09/03/24 0722)  Temperature: 98.2 °F (36.8 °C) (09/03/24 0722)  Temp Source: Oral (09/01/24 0015)  Respirations: 17 (09/02/24 1500)  Height: 5' 9\" (175.3 cm) (08/30/24 2258)  Weight - Scale: 85.7 kg (189 lb) (08/30/24 2258)  SpO2: 97 % (09/03/24 0722)  Exam:   Physical Exam  Vitals reviewed.   Constitutional:       General: He is not in acute distress.     Appearance: Normal appearance. He is not ill-appearing, toxic-appearing or diaphoretic.   Eyes:      General: No scleral icterus.        Right eye: No discharge.         Left eye: No discharge.      Conjunctiva/sclera: Conjunctivae normal.   Cardiovascular:      Rate and Rhythm: Normal rate and regular rhythm.      Heart sounds: No murmur heard.  Pulmonary:      Effort: No respiratory distress.      Breath sounds: No stridor. No wheezing, rhonchi or rales.   Abdominal:      General: There is no distension.      Tenderness: There is no guarding.   Musculoskeletal:      Right lower leg: No edema.      Left lower leg: No edema.   Skin:     General: Skin is warm and dry.      Coloration: Skin is not jaundiced or pale.      Findings: Erythema and lesion present. No bruising or rash.      Comments: Right medial thigh with decreasing distribution and intensity of erythema.  Dry blistering   Neurological:      General: No focal deficit present.      Mental Status: He is alert. Mental status is at baseline.   Psychiatric:         Mood and Affect: Mood normal.         Thought Content: Thought content normal.          Discussion with Family: Patient declined call to " .  10:30 am wife called; all questions addressed    Discharge instructions/Information to patient and family:   See after visit summary for information provided to patient and family.      Provisions for Follow-Up Care:  See after visit summary for information related to follow-up care and any pertinent home health orders.      Mobility at time of Discharge:   Basic Mobility Inpatient Raw Score: 23  JH-HLM Goal: 7: Walk 25 feet or more  JH-HLM Achieved: 8: Walk 250 feet ot more  HLM Goal achieved. Continue to encourage appropriate mobility.     Disposition:   Home    Planned Readmission: none     Discharge Statement:  I spent 35 minutes discharging the patient. This time was spent on the day of discharge. I had direct contact with the patient on the day of discharge. Greater than 50% of the total time was spent examining patient, answering all patient questions, arranging and discussing plan of care with patient as well as directly providing post-discharge instructions.  Additional time then spent on discharge activities.  Case discussed with nursing and case management.  I rounded with the infectious disease attending.    Discharge Medications:  See after visit summary for reconciled discharge medications provided to patient and/or family.      **Please Note: This note may have been constructed using a voice recognition system**

## 2024-09-04 ENCOUNTER — TRANSITIONAL CARE MANAGEMENT (OUTPATIENT)
Dept: FAMILY MEDICINE CLINIC | Facility: CLINIC | Age: 29
End: 2024-09-04

## 2024-09-04 LAB
ATRIAL RATE: 288 BPM
QRS AXIS: 70 DEGREES
QRSD INTERVAL: 82 MS
QT INTERVAL: 304 MS
QTC INTERVAL: 431 MS
T WAVE AXIS: 51 DEGREES
VENTRICULAR RATE: 121 BPM

## 2024-09-04 PROCEDURE — 93010 ELECTROCARDIOGRAM REPORT: CPT | Performed by: INTERNAL MEDICINE

## 2024-09-04 NOTE — UTILIZATION REVIEW
NOTIFICATION OF ADMISSION DISCHARGE   This is a Notification of Discharge from [facility]. Please be advised that this patient has been discharge from our facility. Below you will find the admission and discharge date and time including the patient’s disposition.   UTILIZATION REVIEW CONTACT:  Betty Mcguire  Utilization   Network Utilization Review Department  Phone: [phone] x carefully listen to the prompts. All voicemails are confidential.  Email: [email or web address]     ADMISSION INFORMATION  PRESENTATION DATE: 8/30/2024 10:53 PM  OBERVATION ADMISSION DATE: N/A  INPATIENT ADMISSION DATE: 8/31/24  1:12 AM   DISCHARGE DATE: 9/3/2024 12:12 PM   DISPOSITION:Home/Self Care    Network Utilization Review Department  ATTENTION: Please call with any questions or concerns to [phone] and carefully listen to the prompts so that you are directed to the right person. All voicemails are confidential.   For Discharge needs, contact Care Management DC Support Team at [phone] opt. 2  Send all requests for admission clinical reviews, approved or denied determinations and any other requests to dedicated fax number below belonging to the campus where the patient is receiving treatment. List of dedicated fax numbers for the Facilities:  FACILITY NAME UR FAX NUMBER   ADMISSION DENIALS (Administrative/Medical Necessity) [phone]   DISCHARGE SUPPORT TEAM ([facility]) [phone]   PARENT CHILD HEALTH (Maternity/NICU/Pediatrics) [phone]   [facility] [phone]   [facility] [phone]   [facility] [phone]   [facility] [phone]   [facility] [phone]   [facility] [phone]   [facility] [phone]   [facility] [phone]    [facility] [phone]   [facility] [phone]   [facility] [phone]   [facility] [phone]

## 2024-09-05 ENCOUNTER — OFFICE VISIT (OUTPATIENT)
Dept: FAMILY MEDICINE CLINIC | Facility: CLINIC | Age: 29
End: 2024-09-05
Payer: COMMERCIAL

## 2024-09-05 VITALS
SYSTOLIC BLOOD PRESSURE: 130 MMHG | HEART RATE: 71 BPM | RESPIRATION RATE: 16 BRPM | DIASTOLIC BLOOD PRESSURE: 80 MMHG | BODY MASS INDEX: 27.28 KG/M2 | TEMPERATURE: 98.4 F | HEIGHT: 69 IN | OXYGEN SATURATION: 100 % | WEIGHT: 184.2 LBS

## 2024-09-05 DIAGNOSIS — L03.115 CELLULITIS OF RIGHT LOWER EXTREMITY: Primary | ICD-10-CM

## 2024-09-05 DIAGNOSIS — Z13.6 ENCOUNTER FOR LIPID SCREENING FOR CARDIOVASCULAR DISEASE: ICD-10-CM

## 2024-09-05 DIAGNOSIS — Z13.220 ENCOUNTER FOR LIPID SCREENING FOR CARDIOVASCULAR DISEASE: ICD-10-CM

## 2024-09-05 DIAGNOSIS — I48.0 PAROXYSMAL A-FIB (HCC): ICD-10-CM

## 2024-09-05 LAB
BACTERIA BLD CULT: NORMAL
BACTERIA BLD CULT: NORMAL

## 2024-09-05 PROCEDURE — 99496 TRANSJ CARE MGMT HIGH F2F 7D: CPT | Performed by: FAMILY MEDICINE

## 2024-09-05 NOTE — PROGRESS NOTES
Transition of Care Visit  Name: Zafar Woodward      : 1995      MRN: [id number]  Encounter Provider: Juan Jose Petersen DO  Encounter Date: 2024   Encounter department: [facility]    Assessment & Plan   1. Cellulitis of right lower extremity  Assessment & Plan:  - Presents for TCM visit after recent hospitalization for right lower extremity cellulitis.  Was seen in urgent care prior to his hospital admission and started on antibiotics.  Noted continuing increasing redness and pain up his leg and presented to the ER.  -CT scan in the ER showed right lower extremity cellulitis without any abscesses.  -Patient was started on IV antibiotics and tested for blood cultures and MRSA which were both negative.  He was transitioned to IV Ancef on 2024.  He started to improve significantly.  He was followed by infectious disease then transition him to oral Keflex for 7 days to complete a 10-day treatment of antibiotics.  -Continues to note improvement in his redness.  Still has some induration in the right calf however much improved.  -No fevers, chills, nausea, vomiting, diarrhea  -Continue antibiotics to completion.  Continue to wash unscented sensitive skin soaps, pat dry.  Can continue to cover while draining.  -Follow-up with any worsening redness, fevers, new signs and symptoms.  2. Paroxysmal A-fib (HCC)  Assessment & Plan:  - Follow-up in the ER on the monitor noted to had episode of A-fib.  This report was after episodes of vomiting.  -Continued on telemetry for 48 hours without any recurrence.  Troponins were drawn and unremarkable.  -Recommend follow-up with cardiology for further evaluation and recommendations.  [id number] score is 0.  -Reviewed possible triggers for A-fib.  -Follow-up as needed.  Orders:  -     Ambulatory Referral to Cardiology; Future  3. Encounter for lipid screening for cardiovascular disease  -     Lipid Panel With Direct LDL; Future         History of  Present Illness     Transitional Care Management Review:   Zafar Woodward is a 29 y.o. male here for TCM follow up.     During the TCM phone call patient stated:  TCM Call       Date and time call was made  9/4/2024  8:29 AM    Patient was hospitial[facility] at  [facility]    Date of Admission  08/30/24    Date of discharge  09/03/24    Diagnosis  Cellulitis of right lower extremity    Disposition  Home    Were the patients medications reviewed and updated  No          TCM Call       Should patient be enrolled in anticoag monitoring?  No    Scheduled for follow up?  Yes    Did you obtain your prescribed medications  Yes    Do you need help managing your prescriptions or medications  No    Is transportation to your appointment needed  No    I have advised the patient to call PCP with any new or worsening symptoms  Alma Bynum,     Living Arrangements  Spouse or Significiant other    Are you recieving any outpatient services  No    Are you recieving home care services  No    Are you using any community resources  No    Current waiver services  No    Have you fallen in the last 12 months  No    Interperter language line needed  No          HPI  Review of Systems   Constitutional:  Negative for chills and fever.   HENT:  Negative for ear pain and sore throat.    Eyes:  Negative for pain and visual disturbance.   Respiratory:  Negative for cough and shortness of breath.    Cardiovascular:  Negative for chest pain and palpitations.   Gastrointestinal:  Negative for abdominal pain and vomiting.   Genitourinary:  Negative for dysuria and hematuria.   Musculoskeletal:  Negative for arthralgias and back pain.   Skin:  Positive for wound. Negative for color change and rash.   Neurological:  Negative for seizures and syncope.   All other systems reviewed and are negative.    Objective     /80 (BP Location: Left arm, Patient Position: Sitting, Cuff Size: Standard)   Pulse 71   Temp 98.4 °F (36.9 °C) (Tympanic)   " Resp 16   Ht 5' 9\" (1.753 m)   Wt 83.6 kg (184 lb 3.2 oz)   SpO2 100%   BMI 27.20 kg/m²     Physical Exam  Vitals and nursing note reviewed.   Constitutional:       General: He is not in acute distress.     Appearance: Normal appearance.   HENT:      Head: Normocephalic and atraumatic.      Right Ear: Tympanic membrane and external ear normal.      Left Ear: Tympanic membrane and external ear normal.      Nose: Nose normal.      Mouth/Throat:      Mouth: Mucous membranes are moist.   Eyes:      Extraocular Movements: Extraocular movements intact.      Conjunctiva/sclera: Conjunctivae normal.      Pupils: Pupils are equal, round, and reactive to light.   Cardiovascular:      Rate and Rhythm: Normal rate and regular rhythm.      Pulses: Normal pulses.      Heart sounds: No murmur heard.  Pulmonary:      Effort: Pulmonary effort is normal.      Breath sounds: Normal breath sounds. No wheezing, rhonchi or rales.   Abdominal:      General: Bowel sounds are normal.      Palpations: Abdomen is soft.      Tenderness: There is no abdominal tenderness. There is no guarding.   Musculoskeletal:         General: Normal range of motion.      Cervical back: Normal range of motion.      Right lower leg: No edema.      Left lower leg: No edema.   Lymphadenopathy:      Cervical: No cervical adenopathy.   Skin:     General: Skin is warm.      Capillary Refill: Capillary refill takes less than 2 seconds.      Findings: Erythema (Right calf much improved, mild erythema with minimal induration.  Pictures in the media.) present.   Neurological:      General: No focal deficit present.      Mental Status: He is alert and oriented to person, place, and time.   Psychiatric:         Mood and Affect: Mood normal.         Behavior: Behavior normal.       Medications have been reviewed by provider in current encounter    Administrative Statements         "

## 2024-09-13 PROBLEM — I48.0 PAROXYSMAL A-FIB (HCC): Status: ACTIVE | Noted: 2024-08-31

## 2024-09-13 NOTE — ASSESSMENT & PLAN NOTE
- Follow-up in the ER on the monitor noted to had episode of A-fib.  This report was after episodes of vomiting.  -Continued on telemetry for 48 hours without any recurrence.  Troponins were drawn and unremarkable.  -Recommend follow-up with cardiology for further evaluation and recommendations.  [id number] score is 0.  -Reviewed possible triggers for A-fib.  -Follow-up as needed.

## 2024-09-13 NOTE — ASSESSMENT & PLAN NOTE
- Presents for TCM visit after recent hospitalization for right lower extremity cellulitis.  Was seen in urgent care prior to his hospital admission and started on antibiotics.  Noted continuing increasing redness and pain up his leg and presented to the ER.  -CT scan in the ER showed right lower extremity cellulitis without any abscesses.  -Patient was started on IV antibiotics and tested for blood cultures and MRSA which were both negative.  He was transitioned to IV Ancef on 9/2/2024.  He started to improve significantly.  He was followed by infectious disease then transition him to oral Keflex for 7 days to complete a 10-day treatment of antibiotics.  -Continues to note improvement in his redness.  Still has some induration in the right calf however much improved.  -No fevers, chills, nausea, vomiting, diarrhea  -Continue antibiotics to completion.  Continue to wash unscented sensitive skin soaps, pat dry.  Can continue to cover while draining.  -Follow-up with any worsening redness, fevers, new signs and symptoms.

## 2024-09-14 ENCOUNTER — TELEPHONE (OUTPATIENT)
Dept: CARDIOLOGY CLINIC | Facility: CLINIC | Age: 29
End: 2024-09-14

## 2024-09-14 NOTE — TELEPHONE ENCOUNTER
Left message to schedule office visit from referral, asked that patient call back [phone] Monday through Friday.

## 2024-09-30 PROBLEM — A41.9 SEPSIS (HCC): Status: RESOLVED | Noted: 2024-08-31 | Resolved: 2024-09-30

## 2024-12-12 ENCOUNTER — OFFICE VISIT (OUTPATIENT)
Dept: UROLOGY | Facility: CLINIC | Age: 29
End: 2024-12-12
Payer: COMMERCIAL

## 2024-12-12 VITALS
OXYGEN SATURATION: 98 % | DIASTOLIC BLOOD PRESSURE: 70 MMHG | BODY MASS INDEX: 27.58 KG/M2 | WEIGHT: 186.2 LBS | SYSTOLIC BLOOD PRESSURE: 112 MMHG | HEART RATE: 69 BPM | HEIGHT: 69 IN

## 2024-12-12 DIAGNOSIS — Z30.2 ENCOUNTER FOR STERILIZATION: Primary | ICD-10-CM

## 2024-12-12 PROCEDURE — 99203 OFFICE O/P NEW LOW 30 MIN: CPT

## 2024-12-12 RX ORDER — DIAZEPAM 10 MG/1
TABLET ORAL
Qty: 1 TABLET | Refills: 0 | Status: SHIPPED | OUTPATIENT
Start: 2024-12-12

## 2024-12-12 NOTE — PROGRESS NOTES
Referring Physician: Juan Jose Petersen DO  A copy of this consultation note was communicated to the referring physician.       There are no diagnoses linked to this encounter.        Assessment and plan:       We had a long discussion regarding the options for birth control.  I told the patient that vasectomy is considered to be a permanent surgical sterilization procedure.  We spoke about other options including the possibility of vasectomy reversal at a later time.  He understands that vasectomy confers no immunity to STDs.  I also told him that according to our present knowledge, there is no causal relationship between vasectomy and subsequent development of prostate cancer or testicular cancer.  No change in libido erection or ejaculation.    We spoke about the potential complications.  The most common one in the short term is scrotal hematoma and infection, which rarely requires re-operation.  Additionally, he can react to the anesthetic, develop scrotal swelling, have pain or skin bruising.  We spoke about post procedure care to try to minimize this complication.  I also asked him to refrain from aspirin or fish oil products and alcohol prior to the procedure.  The long-term complications include but are not limited to vasectomy failure by recanalization, chronic epididymal discomfort, pain, among other possibilities.    I described to him how this procedure is normally performed in an office setting and he understands that if anesthesia is desired, this can be performed for him in an outpatient operative setting.  Finally, he understands that following vasectomy, he’ll need to use other means of birth control until he’s had semen analyses that demonstrate the absence of sperm.  He understands it will be his responsibility to submit these semen specimens and call our office for the results. I told him again that recanalization is a small but real possibility, and if he is ever concerned about it he can submit  another semen specimen for analysis.      After discussing the risks, benefits, possible complications and alternatives, informed consents were obtained.  Diazepam was prescribed, and review dosing, adverse effects and timing of the procedure.  He will return in the near future for the procedure.            Chief Complaint     Desire for vasectomy      History of Present Illness     Zafar Woodward is a 29 y.o. male referred for evaluation of vasectomy.    He has 3 biological children.  He states that he and his partner have come to the mutual decision they do not desire any additional children.    He has past medical hx of paroxysmal atrial fibrillation in setting of sepsis from cellulitis of right lower extremity (8/30/24). Denies past surgical, or past urologic history.    Detailed Urologic History     - please refer to HPI    Review of Systems     Review of Systems   Constitutional: Negative for activity change and fatigue.   HENT: Negative for congestion.    Eyes: Negative for visual disturbance.   Respiratory: Negative for shortness of breath and wheezing.    Cardiovascular: Negative for chest pain and leg swelling.   Gastrointestinal: Negative for abdominal pain.   Endocrine: Negative for polyuria.   Genitourinary: Negative for dysuria, flank pain, hematuria and urgency.   Musculoskeletal: Negative for back pain.   Allergic/Immunologic: Negative for immunocompromised state.   Neurological: Negative for dizziness and numbness.   Psychiatric/Behavioral: Negative for dysphoric mood.   All other systems reviewed and are negative.        Allergies     No Known Allergies    Physical Exam     Physical Exam   Constitutional: He is oriented to person, place, and time. He appears well-developed and well-nourished. No distress.   HENT:   Head: Normocephalic and atraumatic.   Eyes: EOM are normal.   Neck: Normal range of motion.   Cardiovascular:   Negative lower extremity edema   Pulmonary/Chest: Effort normal and  "breath sounds normal.   Abdominal: Soft.   Genitourinary:   Genitourinary Comments: Vas deferens are palpable bilaterally.   Musculoskeletal: Normal range of motion.   Neurological: He is alert and oriented to person, place, and time.   Skin: Skin is warm.   Psychiatric: He has a normal mood and affect. His behavior is normal.         Vital Signs  Vitals:    12/12/24 0833   BP: 112/70   BP Location: Left arm   Patient Position: Sitting   Cuff Size: Large   Pulse: 69   SpO2: 98%   Weight: 84.5 kg (186 lb 3.2 oz)   Height: 5' 9\" (1.753 m)         Current Medications       Current Outpatient Medications:     acetaminophen (TYLENOL) 325 mg tablet, Take 2 tablets (650 mg total) by mouth every 6 (six) hours as needed for mild pain, Disp: , Rfl:       Active Problems     Patient Active Problem List   Diagnosis    Rash    Dry skin    Skin lump of arm, left    Dizziness    Cellulitis of right lower extremity    Paroxysmal A-fib (HCC)    Hypomagnesemia         Past Medical History     Past Medical History:   Diagnosis Date    COVID-19 12/26/2021    No pertinent past medical history          Surgical History     Past Surgical History:   Procedure Laterality Date    WISDOM TOOTH EXTRACTION      x 1         Family History     Family History   Problem Relation Age of Onset    No Known Problems Mother     No Known Problems Father     No Known Problems Sister     No Known Problems Brother     No Known Problems Son     No Known Problems Son     No Known Problems Maternal Grandmother     No Known Problems Maternal Grandfather     No Known Problems Paternal Grandmother     No Known Problems Paternal Grandfather          Social History     Social History     Social History     Tobacco Use   Smoking Status Never   Smokeless Tobacco Never       Portions of the record may have been created with voice recognition software.  Occasional wrong word or \"sound a like\" substitutions may have occurred due to the inherent limitations of voice " recognition software.  Read the chart carefully and recognize, using context, where substitutions have occurred.

## 2025-01-30 ENCOUNTER — OFFICE VISIT (OUTPATIENT)
Dept: FAMILY MEDICINE CLINIC | Facility: CLINIC | Age: 30
End: 2025-01-30
Payer: COMMERCIAL

## 2025-01-30 VITALS
DIASTOLIC BLOOD PRESSURE: 68 MMHG | HEIGHT: 69 IN | HEART RATE: 97 BPM | SYSTOLIC BLOOD PRESSURE: 110 MMHG | RESPIRATION RATE: 16 BRPM | WEIGHT: 184.4 LBS | BODY MASS INDEX: 27.31 KG/M2 | TEMPERATURE: 100.4 F | OXYGEN SATURATION: 95 %

## 2025-01-30 DIAGNOSIS — J01.10 ACUTE NON-RECURRENT FRONTAL SINUSITIS: Primary | ICD-10-CM

## 2025-01-30 PROCEDURE — 99213 OFFICE O/P EST LOW 20 MIN: CPT | Performed by: FAMILY MEDICINE

## 2025-01-30 RX ORDER — METHYLPREDNISOLONE 4 MG/1
TABLET ORAL
Qty: 21 EACH | Refills: 0 | Status: SHIPPED | OUTPATIENT
Start: 2025-01-30

## 2025-01-30 NOTE — PROGRESS NOTES
Name: Zafar Woodward      : 1995      MRN: [id number]  Encounter Provider: Juan Jose Petersen DO  Encounter Date: 2025   Encounter department: [facility]    Assessment & Plan  Acute non-recurrent frontal sinusitis    Orders:    amoxicillin-clavulanate (AUGMENTIN) 875-125 mg per tablet; Take 1 tablet by mouth every 12 (twelve) hours for 10 days    methylPREDNISolone 4 MG tablet therapy pack; Use as directed on package         History of Present Illness     Sinusitis  This is a new problem. The current episode started 1 to 4 weeks ago. The problem has been gradually worsening since onset. The maximum temperature recorded prior to his arrival was 100.4 - 100.9 F. The fever has been present for 1 to 2 days. The pain is mild. Associated symptoms include chills, congestion, coughing, headaches and sinus pressure. Pertinent negatives include no ear pain, hoarse voice, neck pain, shortness of breath, sore throat or swollen glands. Past treatments include nothing.     Review of Systems   Constitutional:  Positive for chills. Negative for fever.   HENT:  Positive for congestion, postnasal drip, rhinorrhea and sinus pressure. Negative for ear pain, hoarse voice and sore throat.    Eyes:  Negative for pain and visual disturbance.   Respiratory:  Positive for cough. Negative for shortness of breath.    Cardiovascular:  Negative for chest pain and palpitations.   Gastrointestinal:  Negative for abdominal pain and vomiting.   Genitourinary:  Negative for dysuria and hematuria.   Musculoskeletal:  Negative for arthralgias, back pain and neck pain.   Skin:  Negative for color change and rash.   Neurological:  Positive for headaches. Negative for seizures and syncope.   Psychiatric/Behavioral:  Negative for confusion and sleep disturbance. The patient is not nervous/anxious.    All other systems reviewed and are negative.    Past Medical History:   Diagnosis Date    COVID-19 2021    No pertinent  "past medical history      Past Surgical History:   Procedure Laterality Date    WISDOM TOOTH EXTRACTION      x 1     Family History   Problem Relation Age of Onset    No Known Problems Mother     No Known Problems Father     No Known Problems Sister     No Known Problems Brother     No Known Problems Son     No Known Problems Son     No Known Problems Maternal Grandmother     No Known Problems Maternal Grandfather     No Known Problems Paternal Grandmother     No Known Problems Paternal Grandfather      Social History     Tobacco Use    Smoking status: Never    Smokeless tobacco: Never   Vaping Use    Vaping status: Never Used   Substance and Sexual Activity    Alcohol use: Not Currently    Drug use: No    Sexual activity: Yes     Partners: Female     Birth control/protection: Condom Male     Current Outpatient Medications on File Prior to Visit   Medication Sig    acetaminophen (TYLENOL) 325 mg tablet Take 2 tablets (650 mg total) by mouth every 6 (six) hours as needed for mild pain    diazepam (VALIUM) 10 mg tablet Take 1 tablet one hour prior to procedure. You will need a  to and from your procedure. (Patient not taking: Reported on 1/30/2025)     No Known Allergies  Immunization History   Administered Date(s) Administered    COVID-19 PFIZER VACCINE 0.3 ML IM 06/05/2021, 06/30/2021    Tdap 09/25/2017     Objective   /68 (BP Location: Left arm, Patient Position: Sitting, Cuff Size: Standard)   Pulse 97   Temp 100.4 °F (38 °C) (Tympanic)   Resp 16   Ht 5' 9\" (1.753 m)   Wt 83.6 kg (184 lb 6.4 oz)   SpO2 95%   BMI 27.23 kg/m²     Physical Exam  Vitals and nursing note reviewed.   Constitutional:       General: He is not in acute distress.     Appearance: Normal appearance.   HENT:      Head: Normocephalic and atraumatic.      Right Ear: Tympanic membrane and external ear normal.      Left Ear: Tympanic membrane and external ear normal.      Nose:      Right Sinus: Maxillary sinus tenderness and " frontal sinus tenderness present.      Left Sinus: Maxillary sinus tenderness and frontal sinus tenderness present.      Mouth/Throat:      Mouth: Mucous membranes are moist.   Eyes:      Extraocular Movements: Extraocular movements intact.      Conjunctiva/sclera: Conjunctivae normal.      Pupils: Pupils are equal, round, and reactive to light.   Cardiovascular:      Rate and Rhythm: Normal rate and regular rhythm.      Pulses: Normal pulses.      Heart sounds: Normal heart sounds. No murmur heard.  Pulmonary:      Effort: Pulmonary effort is normal.      Breath sounds: Normal breath sounds. No wheezing, rhonchi or rales.   Abdominal:      General: Bowel sounds are normal.      Palpations: Abdomen is soft.      Tenderness: There is no abdominal tenderness. There is no guarding.   Musculoskeletal:         General: Normal range of motion.      Cervical back: Normal range of motion.      Right lower leg: No edema.      Left lower leg: No edema.   Lymphadenopathy:      Cervical: No cervical adenopathy.   Skin:     General: Skin is warm.      Capillary Refill: Capillary refill takes less than 2 seconds.   Neurological:      General: No focal deficit present.      Mental Status: He is alert and oriented to person, place, and time.   Psychiatric:         Mood and Affect: Mood normal.         Behavior: Behavior normal.

## 2025-04-22 ENCOUNTER — PROCEDURE VISIT (OUTPATIENT)
Dept: UROLOGY | Facility: CLINIC | Age: 30
End: 2025-04-22
Payer: COMMERCIAL

## 2025-04-22 VITALS
BODY MASS INDEX: 26.66 KG/M2 | HEIGHT: 69 IN | SYSTOLIC BLOOD PRESSURE: 150 MMHG | OXYGEN SATURATION: 99 % | WEIGHT: 180 LBS | DIASTOLIC BLOOD PRESSURE: 58 MMHG | HEART RATE: 85 BPM

## 2025-04-22 DIAGNOSIS — Z30.2 ENCOUNTER FOR STERILIZATION: Primary | ICD-10-CM

## 2025-04-22 PROCEDURE — 55250 REMOVAL OF SPERM DUCT(S): CPT | Performed by: UROLOGY

## 2025-04-22 PROCEDURE — 88302 TISSUE EXAM BY PATHOLOGIST: CPT | Performed by: PATHOLOGY

## 2025-04-22 NOTE — PROGRESS NOTES
Assessment/Plan:    Encounter for sterilization  The patient had a vasectomy today without any significant issues. We discussed his plan of care at home. He should try to have minimal activity for 3 days to reduce the risk for bleeding/hematoma. After 3 days he can move as desired but no heavy lifting or running for 2 weeks. No ejaculations for 1-2 weeks. No submerging his incision below water for 2 weeks.  He should use tylenol and NSAIDs (although I asked him minimize NSAID use at first to minimize bleeding risk).  His skin sutures will dissolve on their own. Some skin separation is not uncommon and not concerning. He should contact us for signs of infection, bleeding or persistent pain.  He should ejaculate approximately 30 times before performing a semen analysis. If this does not show sperm or shows rare nonmotile sperm then he is considered infertile but until a semen analysis is performed with these results he should be using protection with intercourse.          Subjective:      Patient ID: Zafar Woodward is a 30 y.o. male.    HPI    Zafar Woodward is a 29 y.o. male here for vasectomy.     He has 3 biological children.  He states that he and his partner have come to the mutual decision they do not desire any additional children.     He has past medical hx of paroxysmal atrial fibrillation in setting of sepsis from cellulitis of right lower extremity (8/30/24). Denies past surgical, or past urologic history.    Past Surgical History:   Procedure Laterality Date    WISDOM TOOTH EXTRACTION      x 1        Past Medical History:   Diagnosis Date    COVID-19 12/26/2021    No pertinent past medical history              Review of Systems   Constitutional:  Negative for chills and fever.   HENT:  Negative for ear pain and sore throat.    Eyes:  Negative for pain and visual disturbance.   Respiratory:  Negative for cough and shortness of breath.    Cardiovascular:  Negative for chest pain and palpitations.  "  Gastrointestinal:  Negative for abdominal pain and vomiting.   Genitourinary:  Negative for dysuria and hematuria.   Musculoskeletal:  Negative for arthralgias and back pain.   Skin:  Negative for color change and rash.   Neurological:  Negative for seizures and syncope.   All other systems reviewed and are negative.        Objective:      /58 (BP Location: Left arm, Patient Position: Sitting, Cuff Size: Standard)   Pulse 85   Ht 5' 9\" (1.753 m)   Wt 81.6 kg (180 lb)   SpO2 99%   BMI 26.58 kg/m²     No results found for: \"PSA\"       Physical Exam  Vitals reviewed.   Constitutional:       Appearance: Normal appearance. He is normal weight.   HENT:      Head: Normocephalic and atraumatic.   Eyes:      Pupils: Pupils are equal, round, and reactive to light.   Abdominal:      General: Abdomen is flat.   Neurological:      General: No focal deficit present.      Mental Status: He is alert and oriented to person, place, and time.   Psychiatric:         Mood and Affect: Mood normal.         Thought Content: Thought content normal.             Vasectomy     Date/Time  4/22/2025 10:30 AM     Performed by  Valeriano Watts MD   Authorized by  Valeriano Watts MD     Universal Protocol   Consent: Written consent obtained.  Risks and benefits: risks, benefits and alternatives were discussed  Consent given by: patient  Patient understanding: patient states understanding of the procedure being performed  Patient consent: the patient's understanding of the procedure matches consent given  Procedure consent: procedure consent matches procedure scheduled  Patient identity confirmed: verbally with patient      Local anesthesia used: yes      Anesthesia: local infiltration     Anesthesia   Local anesthesia used: yes  Local Anesthetic: lidocaine 2% with epinephrine  Anesthetic total: 10 mL     Sedation   Patient sedated: yes  Sedation type: anxiolysis        Specimen: yes   Procedure Details   Procedure Notes: The patient " signed informed consent and then took valium medication.   They were prepped and draped in sterile fashion.  An exam confirmed paplpable vasa bilaterally  Attention was turned to the left vas deferens.  It was isolated and brought the skin and then local anesthetic was used to infiltrate the skin overlying the vas deferens and also attempted to infiltrating the vaginal sheath itself.  The skin was then opened bluntly with dissecting clamp. The vas was secured with a ring clamp.  The vasal complex was infiltrated with additional local anesthetic.  The vasal sheath was opened and dissected so that the vas was exposed. Clamps were used to secure the proximal and distal aspects of the vas and a segment of vas was sharply excised and sent for pathology. The lumens of the proximal and distal vasal segments were then cauterized.  The proximal vasal segment was buried into sheath tissue which was cauterized to create a fascial interposition. The complex was dunked back into the scrotum and then pulled back out assess for hemostasis which appeared adequate.    Attention was then turned to the patient's right side. In a similar fashion the vas deferens on the right was isolated, local anesthetic used to numb the skin and tissue, the skin opened, vas clamped, additional local given, vasal sheath opened, segment excised, and proximal and distal lumens cauterized.    Of note during the procedure the patient became lightheaded and dizzy which he says he has a history of in the past with other procedures.  Therefore was given water and a cold rag and this improved his symptoms he was able to tolerate the procedure in its entirety    The dartos tissue of each skin opening was gently cauterized for hemostasis. The skin was closed with a horizontal chromic suture on each side. Bacitracin was applied to the incisions and the fluffs placed.    Expected and concerning scrotal changes were discussed along with follow up including limited  activity for 3 days followed by mild activity and resumption of heavy lifting and running and ejaculating 2 weeks out along with voiding submersion in water until 4 weeks out. The importance of post vasectomy semen analysis was stressed.  Patient Transportation: confirmed  Patient tolerance: patient tolerated the procedure well with no immediate complications             Orders  Orders Placed This Encounter   Procedures    Vasectomy     This order was created via procedure documentation    Semen analysis, post-vasectomy     This is a patient instruction: Please call Central Scheduling at [phone] to make an appointment for testing.  Please refer to patient instructions on the Post Vasectomy Form provided to you by your doctor. If you have additional questions on collection of your specimen, please call the Lab Call Center at [phone].         Standing Status:   Future     Expiration Date:   6/22/2026

## 2025-04-25 PROCEDURE — 88302 TISSUE EXAM BY PATHOLOGIST: CPT | Performed by: PATHOLOGY

## 2025-06-02 ENCOUNTER — APPOINTMENT (OUTPATIENT)
Dept: LAB | Facility: CLINIC | Age: 30
End: 2025-06-02
Attending: UROLOGY
Payer: COMMERCIAL

## 2025-06-02 ENCOUNTER — RESULTS FOLLOW-UP (OUTPATIENT)
Age: 30
End: 2025-06-02

## 2025-06-02 DIAGNOSIS — Z30.2 ENCOUNTER FOR STERILIZATION: ICD-10-CM

## 2025-06-02 LAB
DEPRECATED CD4 CELLS/CD8 CELLS BLD: 3 ML
SPERM MOTILE SMN QL MICRO: NORMAL

## 2025-06-02 PROCEDURE — 89321 SEMEN ANAL SPERM DETECTION: CPT

## 2025-07-30 ENCOUNTER — OFFICE VISIT (OUTPATIENT)
Dept: FAMILY MEDICINE CLINIC | Facility: CLINIC | Age: 30
End: 2025-07-30
Payer: COMMERCIAL

## 2025-07-30 VITALS
DIASTOLIC BLOOD PRESSURE: 78 MMHG | HEIGHT: 69 IN | OXYGEN SATURATION: 98 % | BODY MASS INDEX: 28.05 KG/M2 | SYSTOLIC BLOOD PRESSURE: 118 MMHG | TEMPERATURE: 98.1 F | RESPIRATION RATE: 20 BRPM | HEART RATE: 76 BPM | WEIGHT: 189.4 LBS

## 2025-07-30 DIAGNOSIS — I48.0 PAROXYSMAL A-FIB (HCC): ICD-10-CM

## 2025-07-30 DIAGNOSIS — L30.3 INFECTIVE DERMATITIS: Primary | ICD-10-CM

## 2025-07-30 PROCEDURE — 99213 OFFICE O/P EST LOW 20 MIN: CPT | Performed by: FAMILY MEDICINE

## 2025-07-30 RX ORDER — DOXYCYCLINE HYCLATE 100 MG
100 TABLET ORAL 2 TIMES DAILY
Qty: 20 TABLET | Refills: 0 | Status: SHIPPED | OUTPATIENT
Start: 2025-07-30 | End: 2025-08-06

## 2025-07-30 RX ORDER — PREDNISONE 10 MG/1
TABLET ORAL
Qty: 20 TABLET | Refills: 0 | Status: SHIPPED | OUTPATIENT
Start: 2025-07-30 | End: 2025-08-06

## 2025-08-06 ENCOUNTER — OFFICE VISIT (OUTPATIENT)
Dept: FAMILY MEDICINE CLINIC | Facility: CLINIC | Age: 30
End: 2025-08-06
Payer: COMMERCIAL

## 2025-08-06 VITALS
SYSTOLIC BLOOD PRESSURE: 140 MMHG | BODY MASS INDEX: 25.17 KG/M2 | HEART RATE: 70 BPM | DIASTOLIC BLOOD PRESSURE: 60 MMHG | WEIGHT: 185.8 LBS | OXYGEN SATURATION: 97 % | RESPIRATION RATE: 16 BRPM | HEIGHT: 72 IN | TEMPERATURE: 97.9 F

## 2025-08-06 DIAGNOSIS — Z13.1 SCREENING FOR DIABETES MELLITUS (DM): ICD-10-CM

## 2025-08-06 DIAGNOSIS — Z13.6 ENCOUNTER FOR LIPID SCREENING FOR CARDIOVASCULAR DISEASE: ICD-10-CM

## 2025-08-06 DIAGNOSIS — F41.8 SITUATIONAL ANXIETY: Primary | ICD-10-CM

## 2025-08-06 DIAGNOSIS — Z13.220 ENCOUNTER FOR LIPID SCREENING FOR CARDIOVASCULAR DISEASE: ICD-10-CM

## 2025-08-06 DIAGNOSIS — Z00.00 ANNUAL PHYSICAL EXAM: ICD-10-CM

## 2025-08-06 PROCEDURE — 99395 PREV VISIT EST AGE 18-39: CPT | Performed by: FAMILY MEDICINE

## 2025-08-06 RX ORDER — LORAZEPAM 0.5 MG/1
0.5 TABLET ORAL ONCE
Qty: 1 TABLET | Refills: 0 | Status: SHIPPED | OUTPATIENT
Start: 2025-08-06 | End: 2025-08-06